# Patient Record
Sex: MALE | Race: ASIAN | Employment: FULL TIME | URBAN - METROPOLITAN AREA
[De-identification: names, ages, dates, MRNs, and addresses within clinical notes are randomized per-mention and may not be internally consistent; named-entity substitution may affect disease eponyms.]

---

## 2019-03-30 ENCOUNTER — HOSPITAL ENCOUNTER (EMERGENCY)
Age: 39
Discharge: HOME | End: 2019-03-30
Attending: EMERGENCY MEDICINE | Admitting: EMERGENCY MEDICINE
Payer: SUBSIDIZED

## 2019-03-30 ENCOUNTER — APPOINTMENT (OUTPATIENT)
Dept: CT IMAGING | Age: 39
End: 2019-03-30
Attending: NURSE PRACTITIONER
Payer: SUBSIDIZED

## 2019-03-30 VITALS
TEMPERATURE: 98.5 F | RESPIRATION RATE: 16 BRPM | HEART RATE: 88 BPM | OXYGEN SATURATION: 97 % | DIASTOLIC BLOOD PRESSURE: 80 MMHG | SYSTOLIC BLOOD PRESSURE: 122 MMHG

## 2019-03-30 DIAGNOSIS — K04.7 DENTAL ABSCESS: Primary | ICD-10-CM

## 2019-03-30 LAB
ALBUMIN SERPL-MCNC: 3.5 G/DL (ref 3.5–5)
ALBUMIN/GLOB SERPL: 0.8 {RATIO} (ref 1.1–2.2)
ALP SERPL-CCNC: 109 U/L (ref 45–117)
ALT SERPL-CCNC: 18 U/L (ref 12–78)
ANION GAP SERPL CALC-SCNC: 5 MMOL/L (ref 5–15)
AST SERPL-CCNC: 16 U/L (ref 15–37)
BASOPHILS # BLD: 0.1 K/UL (ref 0–0.1)
BASOPHILS NFR BLD: 1 % (ref 0–1)
BILIRUB SERPL-MCNC: 0.2 MG/DL (ref 0.2–1)
BUN SERPL-MCNC: 7 MG/DL (ref 6–20)
BUN/CREAT SERPL: 9 (ref 12–20)
CALCIUM SERPL-MCNC: 8.8 MG/DL (ref 8.5–10.1)
CHLORIDE SERPL-SCNC: 109 MMOL/L (ref 97–108)
CO2 SERPL-SCNC: 24 MMOL/L (ref 21–32)
COMMENT, HOLDF: NORMAL
CREAT SERPL-MCNC: 0.76 MG/DL (ref 0.7–1.3)
DIFFERENTIAL METHOD BLD: ABNORMAL
EOSINOPHIL # BLD: 0.1 K/UL (ref 0–0.4)
EOSINOPHIL NFR BLD: 1 % (ref 0–7)
ERYTHROCYTE [DISTWIDTH] IN BLOOD BY AUTOMATED COUNT: 12.9 % (ref 11.5–14.5)
GLOBULIN SER CALC-MCNC: 4.5 G/DL (ref 2–4)
GLUCOSE SERPL-MCNC: 94 MG/DL (ref 65–100)
HCT VFR BLD AUTO: 45.3 % (ref 36.6–50.3)
HGB BLD-MCNC: 14.2 G/DL (ref 12.1–17)
IMM GRANULOCYTES # BLD AUTO: 0.1 K/UL (ref 0–0.04)
IMM GRANULOCYTES NFR BLD AUTO: 1 % (ref 0–0.5)
LACTATE BLD-SCNC: 1.14 MMOL/L (ref 0.4–2)
LYMPHOCYTES # BLD: 2.2 K/UL (ref 0.8–3.5)
LYMPHOCYTES NFR BLD: 21 % (ref 12–49)
MCH RBC QN AUTO: 28.5 PG (ref 26–34)
MCHC RBC AUTO-ENTMCNC: 31.3 G/DL (ref 30–36.5)
MCV RBC AUTO: 90.8 FL (ref 80–99)
MONOCYTES # BLD: 1.1 K/UL (ref 0–1)
MONOCYTES NFR BLD: 11 % (ref 5–13)
NEUTS SEG # BLD: 7.1 K/UL (ref 1.8–8)
NEUTS SEG NFR BLD: 65 % (ref 32–75)
NRBC # BLD: 0 K/UL (ref 0–0.01)
NRBC BLD-RTO: 0 PER 100 WBC
PLATELET # BLD AUTO: 211 K/UL (ref 150–400)
PMV BLD AUTO: 8.8 FL (ref 8.9–12.9)
POTASSIUM SERPL-SCNC: 3.9 MMOL/L (ref 3.5–5.1)
PROT SERPL-MCNC: 8 G/DL (ref 6.4–8.2)
RBC # BLD AUTO: 4.99 M/UL (ref 4.1–5.7)
SAMPLES BEING HELD,HOLD: NORMAL
SODIUM SERPL-SCNC: 138 MMOL/L (ref 136–145)
WBC # BLD AUTO: 10.6 K/UL (ref 4.1–11.1)

## 2019-03-30 PROCEDURE — 96375 TX/PRO/DX INJ NEW DRUG ADDON: CPT

## 2019-03-30 PROCEDURE — 74011636320 HC RX REV CODE- 636/320: Performed by: RADIOLOGY

## 2019-03-30 PROCEDURE — 70487 CT MAXILLOFACIAL W/DYE: CPT

## 2019-03-30 PROCEDURE — 74011000250 HC RX REV CODE- 250: Performed by: NURSE PRACTITIONER

## 2019-03-30 PROCEDURE — 74011250636 HC RX REV CODE- 250/636: Performed by: NURSE PRACTITIONER

## 2019-03-30 PROCEDURE — 85025 COMPLETE CBC W/AUTO DIFF WBC: CPT

## 2019-03-30 PROCEDURE — 36415 COLL VENOUS BLD VENIPUNCTURE: CPT

## 2019-03-30 PROCEDURE — 80053 COMPREHEN METABOLIC PANEL: CPT

## 2019-03-30 PROCEDURE — 83605 ASSAY OF LACTIC ACID: CPT

## 2019-03-30 PROCEDURE — 87040 BLOOD CULTURE FOR BACTERIA: CPT

## 2019-03-30 PROCEDURE — 74011000258 HC RX REV CODE- 258: Performed by: RADIOLOGY

## 2019-03-30 PROCEDURE — 96361 HYDRATE IV INFUSION ADD-ON: CPT

## 2019-03-30 PROCEDURE — 96365 THER/PROPH/DIAG IV INF INIT: CPT

## 2019-03-30 PROCEDURE — 99284 EMERGENCY DEPT VISIT MOD MDM: CPT

## 2019-03-30 PROCEDURE — 74011250637 HC RX REV CODE- 250/637: Performed by: NURSE PRACTITIONER

## 2019-03-30 RX ORDER — CLINDAMYCIN HYDROCHLORIDE 300 MG/1
300 CAPSULE ORAL 4 TIMES DAILY
Qty: 40 CAP | Refills: 0 | Status: SHIPPED | OUTPATIENT
Start: 2019-03-30 | End: 2019-04-09

## 2019-03-30 RX ORDER — KETOROLAC TROMETHAMINE 30 MG/ML
15 INJECTION, SOLUTION INTRAMUSCULAR; INTRAVENOUS
Status: COMPLETED | OUTPATIENT
Start: 2019-03-30 | End: 2019-03-30

## 2019-03-30 RX ORDER — KETOROLAC TROMETHAMINE 30 MG/ML
15 INJECTION, SOLUTION INTRAMUSCULAR; INTRAVENOUS
Status: DISCONTINUED | OUTPATIENT
Start: 2019-03-30 | End: 2019-03-30

## 2019-03-30 RX ORDER — KETOROLAC TROMETHAMINE 30 MG/ML
15 INJECTION, SOLUTION INTRAMUSCULAR; INTRAVENOUS
Status: DISCONTINUED | OUTPATIENT
Start: 2019-03-30 | End: 2019-03-30 | Stop reason: SDUPTHER

## 2019-03-30 RX ORDER — CLINDAMYCIN PHOSPHATE 600 MG/50ML
600 INJECTION INTRAVENOUS
Status: COMPLETED | OUTPATIENT
Start: 2019-03-30 | End: 2019-03-30

## 2019-03-30 RX ORDER — SODIUM CHLORIDE 0.9 % (FLUSH) 0.9 %
10 SYRINGE (ML) INJECTION
Status: COMPLETED | OUTPATIENT
Start: 2019-03-30 | End: 2019-03-30

## 2019-03-30 RX ORDER — CHLORHEXIDINE GLUCONATE 1.2 MG/ML
15 RINSE ORAL 2 TIMES DAILY
Qty: 420 ML | Refills: 0 | Status: SHIPPED | OUTPATIENT
Start: 2019-03-30 | End: 2019-04-13

## 2019-03-30 RX ORDER — IBUPROFEN 600 MG/1
600 TABLET ORAL
Qty: 20 TAB | Refills: 0 | OUTPATIENT
Start: 2019-03-30 | End: 2021-08-07

## 2019-03-30 RX ADMIN — LIDOCAINE HYDROCHLORIDE: 20 SOLUTION ORAL; TOPICAL at 17:37

## 2019-03-30 RX ADMIN — IOPAMIDOL 100 ML: 612 INJECTION, SOLUTION INTRAVENOUS at 16:01

## 2019-03-30 RX ADMIN — KETOROLAC TROMETHAMINE 15 MG: 30 INJECTION, SOLUTION INTRAMUSCULAR; INTRAVENOUS at 13:59

## 2019-03-30 RX ADMIN — SODIUM CHLORIDE 1000 ML: 900 INJECTION, SOLUTION INTRAVENOUS at 13:59

## 2019-03-30 RX ADMIN — SODIUM CHLORIDE 100 ML: 900 INJECTION, SOLUTION INTRAVENOUS at 16:01

## 2019-03-30 RX ADMIN — CLINDAMYCIN PHOSPHATE 600 MG: 600 INJECTION, SOLUTION INTRAVENOUS at 14:41

## 2019-03-30 RX ADMIN — Medication 10 ML: at 16:01

## 2019-03-30 NOTE — ED PROVIDER NOTES
CC:Tooth ache and facial swelling Started:2-3 days ago. Swelling started yesterday. Endorses: facial swelling that is increasing and pain. No teeth on that side of the face, hurts to chew. Has dentures, but ill fitting. Mild HA. Low grade temp in triage. Denies: F/C, N/V. VA changes Made better: nothing Made worse:eating. No further complaints. Tried tylenol and motrin without help No past medical history on file. Reviewed Primary care provider: No primary care provider on file. The history is provided by the patient and the spouse. No  was used. No past medical history on file. No past surgical history on file. No family history on file. Social History Socioeconomic History  Marital status: Not on file Spouse name: Not on file  Number of children: Not on file  Years of education: Not on file  Highest education level: Not on file Occupational History  Not on file Social Needs  Financial resource strain: Not on file  Food insecurity:  
  Worry: Not on file Inability: Not on file  Transportation needs:  
  Medical: Not on file Non-medical: Not on file Tobacco Use  Smoking status: Not on file Substance and Sexual Activity  Alcohol use: Not on file  Drug use: Not on file  Sexual activity: Not on file Lifestyle  Physical activity:  
  Days per week: Not on file Minutes per session: Not on file  Stress: Not on file Relationships  Social connections:  
  Talks on phone: Not on file Gets together: Not on file Attends Orthodox service: Not on file Active member of club or organization: Not on file Attends meetings of clubs or organizations: Not on file Relationship status: Not on file  Intimate partner violence:  
  Fear of current or ex partner: Not on file Emotionally abused: Not on file Physically abused: Not on file Forced sexual activity: Not on file Other Topics Concern  Not on file Social History Narrative  Not on file ALLERGIES: Patient has no allergy information on record. Review of Systems Constitutional: Negative for chills and fever. HENT: Positive for dental problem and facial swelling. Negative for congestion, ear discharge, ear pain, rhinorrhea, sinus pressure, sore throat, trouble swallowing and voice change. Eyes: Negative for visual disturbance. Gastrointestinal: Negative for constipation, diarrhea, nausea and vomiting. All other systems reviewed and are negative. Vitals:  
 03/30/19 1247 03/30/19 1316 BP:  149/77 Pulse: (!) 113 (!) 107 Resp:  16 Temp:  99.7 °F (37.6 °C) SpO2: 98% (!) 9% Physical Exam  
Constitutional: He is oriented to person, place, and time. He appears well-developed and well-nourished. HENT:  
Head: Normocephalic and atraumatic. Mouth/Throat: Uvula is midline and oropharynx is clear and moist. He has dentures. Oral lesions present. There is trismus in the jaw. Abnormal dentition. Dental abscesses and dental caries present. No uvula swelling or lacerations. Broken teeth to the lower jaw under the partial denture. Bleeding and TTP around lower jaw. Unable to remove upper partial due to pain. No apparent erythema to the area. TTP over outer cheek and TTP over jaw. Pain with opening and closing the jaw. Unable to open jaw fully. Neck: Normal range of motion. Neck supple. Cardiovascular: Normal rate, regular rhythm, normal heart sounds and intact distal pulses. Pulmonary/Chest: Effort normal and breath sounds normal. No respiratory distress. He has no wheezes. He has no rales. He exhibits no tenderness. Abdominal: Soft. Bowel sounds are normal. There is no tenderness. There is no guarding. Musculoskeletal: Normal range of motion.   
Lymphadenopathy:  
     Head (right side): No submental, no submandibular, no tonsillar, no preauricular and no posterior auricular adenopathy present. Head (left side): No submental, no submandibular, no tonsillar, no preauricular and no posterior auricular adenopathy present. He has no cervical adenopathy. Neurological: He is alert and oriented to person, place, and time. Skin: Skin is warm and dry. No erythema. Psychiatric: He has a normal mood and affect. His behavior is normal. Judgment and thought content normal.  
Nursing note and vitals reviewed. MDM Procedures Assessment & Plan:  
 
Orders Placed This Encounter  CULTURE, BLOOD  CT MAXILLOFACIAL W CONT  CBC WITH AUTOMATED DIFF  
 METABOLIC PANEL, COMPREHENSIVE  
 Hold Sample  POC  LACTIC ACID  ketorolac (TORADOL) injection 15 mg  
 sodium chloride 0.9 % bolus infusion 1,000 mL Discussed with Maria E Kerns DO,ED Provider Leticia Montoya NP 
03/30/19 
1:22 PM 
 
CT with abscess. Clindamycin 4 times daily for 10 days. Peridex mouth wash Bid x 14 days. OMFS or dental follow-up. Discussed return precautions. 5:24 PM 
The patient has been reevaluated. The patient is ready for discharge. The patient's signs, symptoms, diagnosis, and discharge instructions have been discussed and the patient/ family has conveyed their understanding. The patient is to follow up as recommended or return to the ED should their symptoms worsen. Plan has been discussed and the patient is in agreement. LABORATORY TESTS: 
Labs Reviewed CBC WITH AUTOMATED DIFF - Abnormal; Notable for the following components:  
    Result Value MPV 8.8 (*) IMMATURE GRANULOCYTES 1 (*)   
 ABS. MONOCYTES 1.1 (*)   
 ABS. IMM. GRANS. 0.1 (*) All other components within normal limits METABOLIC PANEL, COMPREHENSIVE - Abnormal; Notable for the following components:  
 Chloride 109 (*)   
 BUN/Creatinine ratio 9 (*) Globulin 4.5 (*) A-G Ratio 0.8 (*) All other components within normal limits CULTURE, BLOOD SAMPLES BEING HELD POC LACTIC ACID IMAGING RESULTS: 
Ct Maxillofacial W Cont Result Date: 3/30/2019 EXAM: CT MAXILLOFACIAL W CONT INDICATION: Facial swelling with trismus COMPARISON: None. CONTRAST: 100 mL of Isovue-300 TECHNIQUE:  Multislice helical CT of the facial bones was performed in the axial plane during uneventful rapid bolus intravenous contrast administration. Coronal and sagittal reformations were generated. CT dose reduction was achieved through use of a standardized protocol tailored for this examination and automatic exposure control for dose modulation. FINDINGS: There is no facial fracture or other osseous abnormality. There are lucencies at the base of the right and left maxillary incisors and first two mandibular bicuspid teeth. Anterior to the left maxillary lucency there is a small fluid collection along the superficial surface of the maxilla through focal cortical breakthrough at the anterior margin of the lucency. The visualized paranasal sinuses and mastoid air cells show mucoperiosteal thickening in the left maxillary sinus but otherwise are clear. The globes, optic nerves and extraocular muscles are normal. No abnormalities are identified within the visualized portions of the brain or nasopharynx. IMPRESSION: Left maxillary periapical abscess with suspected cortical breakthrough anteriorly and small soft tissue abscess superficial to the maxilla. Additional right maxillary and left mandibular lucencies suggesting periapical abscesses versus dentigerous cysts. MEDICATIONS GIVEN: 
Medications  
dental ball (lidocaine/benadryl/benzocaine) mixture (has no administration in time range)  
ketorolac (TORADOL) injection 15 mg (15 mg IntraVENous Given 3/30/19 1359)  
sodium chloride 0.9 % bolus infusion 1,000 mL (0 mL IntraVENous IV Completed 3/30/19 1439) clindamycin (CLEOCIN) 600mg D5W 50mL IVPB (premix) (0 mg IntraVENous IV Completed 3/30/19 1511) iopamidol (ISOVUE 300) 61 % contrast injection 100 mL (100 mL IntraVENous Given 3/30/19 1601)  
sodium chloride 0.9 % bolus infusion 100 mL (100 mL IntraVENous New Bag 3/30/19 1601)  
sodium chloride (NS) flush 10 mL (10 mL IntraVENous Given 3/30/19 1601) IMPRESSION: 
1. Dental abscess PLAN: 
1. Current Discharge Medication List  
  
START taking these medications Details  
clindamycin (CLEOCIN) 300 mg capsule Take 1 Cap by mouth four (4) times daily for 10 days. Qty: 40 Cap, Refills: 0  
  
chlorhexidine (PERIDEX) 0.12 % solution 15 mL by Swish and Spit route two (2) times a day for 14 days. Indications: dental infection 
Qty: 420 mL, Refills: 0  
  
ibuprofen (MOTRIN) 600 mg tablet Take 1 Tab by mouth every six (6) hours as needed for Pain. Qty: 20 Tab, Refills: 0  
  
  
 
2. Follow-up Information Follow up With Specialties Details Why Contact Info Other, Phys, MD  Schedule an appointment as soon as possible for a visit As needed Patient can only remember the practice name and not the physician 
  
 dental provider in this packet  Schedule an appointment as soon as possible for a visit [facility]  Schedule an appointment as soon as possible for a visit  [address] [place] [address] [facility] EMERGENCY DEP Emergency Medicine  As needed, If symptoms worsen [address] [facility] [address] [phone] 3. Return to ED for new or worsening symptoms Shahzad Leyva NP

## 2019-03-30 NOTE — ED TRIAGE NOTES
Pt arrives ambulatory to ED with wife with c/o Left lower gum pain, redness and swelling onset 3 days ago with low grade temp.

## 2019-03-30 NOTE — DISCHARGE INSTRUCTIONS
Thank you for allowing us to care for you today. Please follow-up with your Primary Care provider in the next 2-3 days if your symptoms do not improve. Plan for home:     Salt water mouth rinses to help with inflammation and infection in the mouth    Clindamycin 4 times a day for  dental abscess    Follow-up next week in a dental clinic listed in this packet. Each provider is different. Please contact your preferred dental care provider and follow their directions. It would be best to follow-up with [facility] oral surgery, but without insurance, your visit could be expensive. Peridex mouth wash twice daily for 14 days to decrease the bacteria in your mouth. Use after eating and wait at least 30 minutes to eat or drink after using. Dental balls as needed for pain. Tylenol 1000 mg alternating with Ibuprofen 600mg every 6 hours for pain control. Example: 8am take Ibuprofen. 11am take tylenol. 2pm take ibuprofen. 5pm take tylenol. 8pm take ibuprofen. 11pm take tylenol. You may continue this process overnight if you have continued pain/discomfort. Emergency [facility]   [address] [place] [address], [facility]    Monday, Wednesday, Friday: 8am-5pm  Tuesday and Thursday: 8am-6pm  Phone: [phone]  $70 for Emergency Care  $60 for first routine care, then pay by sliding scale based upon income      [facility]  [address] [address] [address]   Monday-Friday: 8am-4pm  Phone: [phone] of Dentistry Urgent [address] [place] [address] Dentistry, [address] [place] [address] [address] Scott Chaudhary [address]  Phone: [phone] to confirm a time for emergency treatment  Pediatrics: [phone]  $75 per tooth, extractions only     [facility]  [address], [address] [facility] [address]   Phone [phone] or [phone]  Hours [id number] (extractions)  Simple tooth extraction: $60 per tooth, $55 per x-ray     Susana Vann the [facility] (in [place])  [facility] AT [place] only  Phone: [phone], leave message saying you need an appointment to register  Hours: Wed 6-9p       Non-Urgent [facility] ([facility]  Jayshree Azar [address]  Phone: [phone]        If you are on coumadin: Please monitor your INR or Coumadin levels while on this medication as it can increase your INR level substantially. Please be careful not to cut herself. Avoid shaving until you have completed her antibody therapy. Should you develop uncontrolled bleeding please go to the closest emergency department. We hope that we have addressed all of your medical concerns. The examination and treatment you received in the Emergency Department were for an emergent problem and were not intended as complete care. It is important that you follow up with your healthcare provider(s) for ongoing care. If your symptoms worsen or do not improve as expected, and you are unable to reach your usual health care provider(s), you should return to the Emergency Department. Today's healthcare is undergoing tremendous change, and patient satisfaction surveys are one of the many tools to assess the quality of medical care. You may receive a survey from the [employer] regarding your experience in the Emergency Department. I hope that your experience has been completely positive, particularly the medical care that I provided. As such, please participate in the survey; anything less than excellent does not meet my expectations or intentions. [facility] and [facility] participate in nationally recognized quality of care measures. If your blood pressure is greater than 120/80, as reported below, we urge that you seek medical care to address the potential of high blood pressure, commonly known as hypertension.    Hypertension can be hereditary or can be caused by certain medical conditions, pain, stress, or \"white coat syndrome. \"       Please make an appointment with your health care provider(s) for follow up of your Emergency Department visit. Thank you for allowing us to provide you with medical care today. We realize that you have many choices for your emergency care needs. Please choose us in the future for any continued health care needs. Regards,   Komal Boyer, [address] [facility] Cir: [phone]        Ct Maxillofacial W Cont    Result Date: 3/30/2019  EXAM: CT MAXILLOFACIAL W CONT INDICATION: Facial swelling with trismus COMPARISON: None. CONTRAST: 100 mL of Isovue-300 TECHNIQUE:  Multislice helical CT of the facial bones was performed in the axial plane during uneventful rapid bolus intravenous contrast administration. Coronal and sagittal reformations were generated. CT dose reduction was achieved through use of a standardized protocol tailored for this examination and automatic exposure control for dose modulation. FINDINGS: There is no facial fracture or other osseous abnormality. There are lucencies at the base of the right and left maxillary incisors and first two mandibular bicuspid teeth. Anterior to the left maxillary lucency there is a small fluid collection along the superficial surface of the maxilla through focal cortical breakthrough at the anterior margin of the lucency. The visualized paranasal sinuses and mastoid air cells show mucoperiosteal thickening in the left maxillary sinus but otherwise are clear. The globes, optic nerves and extraocular muscles are normal. No abnormalities are identified within the visualized portions of the brain or nasopharynx. IMPRESSION: Left maxillary periapical abscess with suspected cortical breakthrough anteriorly and small soft tissue abscess superficial to the maxilla.  Additional right maxillary and left mandibular lucencies suggesting periapical abscesses versus dentigerous cysts.

## 2019-03-30 NOTE — LETTER
[address] 
[address] [facility] [address] 
[phone] Work/School Note Date: 3/30/2019 To Whom It May concern: 
 
Caridad Staton was seen and treated today in the emergency room by the following provider(s): 
Attending Provider: Adriana Vargas DO 
Nurse Practitioner: Julius Laguna NP. Caridad Staton may return to work on Tuesday April 2, 2019. Sincerely, Akhil Alegria NP

## 2019-03-30 NOTE — LETTER
NOTIFICATION RETURN TO WORK / SCHOOL 
 
3/30/2019 6:08 PM 
 
Mr. Elias Greenwood [address] [address] To Whom It May Concern: 
 
Elias Greenwood is currently under the care of [facility]. He will return to work/school on: 4/1/2019. If there are questions or concerns please have the patient contact our office. Sincerely, 
 
 
 
 
Angelique Cortez 34

## 2019-04-05 LAB
BACTERIA SPEC CULT: NORMAL
SERVICE CMNT-IMP: NORMAL

## 2019-06-02 ENCOUNTER — APPOINTMENT (OUTPATIENT)
Dept: CT IMAGING | Age: 39
End: 2019-06-02
Attending: EMERGENCY MEDICINE
Payer: SUBSIDIZED

## 2019-06-02 ENCOUNTER — APPOINTMENT (OUTPATIENT)
Dept: ULTRASOUND IMAGING | Age: 39
End: 2019-06-02
Attending: EMERGENCY MEDICINE
Payer: SUBSIDIZED

## 2019-06-02 ENCOUNTER — HOSPITAL ENCOUNTER (EMERGENCY)
Age: 39
Discharge: HOME | End: 2019-06-02
Attending: EMERGENCY MEDICINE | Admitting: EMERGENCY MEDICINE
Payer: SUBSIDIZED

## 2019-06-02 VITALS
WEIGHT: 195 LBS | HEIGHT: 67 IN | TEMPERATURE: 98.6 F | HEART RATE: 73 BPM | SYSTOLIC BLOOD PRESSURE: 109 MMHG | DIASTOLIC BLOOD PRESSURE: 63 MMHG | RESPIRATION RATE: 18 BRPM | OXYGEN SATURATION: 100 % | BODY MASS INDEX: 30.61 KG/M2

## 2019-06-02 DIAGNOSIS — R10.11 ABDOMINAL PAIN, RIGHT UPPER QUADRANT: Primary | ICD-10-CM

## 2019-06-02 LAB
ALBUMIN SERPL-MCNC: 4.1 G/DL (ref 3.5–5)
ALBUMIN/GLOB SERPL: 0.9 {RATIO} (ref 1.1–2.2)
ALP SERPL-CCNC: 113 U/L (ref 45–117)
ALT SERPL-CCNC: 31 U/L (ref 12–78)
ANION GAP SERPL CALC-SCNC: 6 MMOL/L (ref 5–15)
APPEARANCE UR: CLEAR
AST SERPL-CCNC: 24 U/L (ref 15–37)
BACTERIA URNS QL MICRO: NEGATIVE /HPF
BASOPHILS # BLD: 0 K/UL (ref 0–0.1)
BASOPHILS NFR BLD: 0 % (ref 0–1)
BILIRUB SERPL-MCNC: 0.5 MG/DL (ref 0.2–1)
BILIRUB UR QL: NEGATIVE
BUN SERPL-MCNC: 15 MG/DL (ref 6–20)
BUN/CREAT SERPL: 16 (ref 12–20)
CALCIUM SERPL-MCNC: 9.5 MG/DL (ref 8.5–10.1)
CHLORIDE SERPL-SCNC: 106 MMOL/L (ref 97–108)
CO2 SERPL-SCNC: 25 MMOL/L (ref 21–32)
COLOR UR: ABNORMAL
COMMENT, HOLDF: NORMAL
CREAT SERPL-MCNC: 0.96 MG/DL (ref 0.7–1.3)
DIFFERENTIAL METHOD BLD: ABNORMAL
EOSINOPHIL # BLD: 0.1 K/UL (ref 0–0.4)
EOSINOPHIL NFR BLD: 0 % (ref 0–7)
EPITH CASTS URNS QL MICRO: ABNORMAL /LPF
ERYTHROCYTE [DISTWIDTH] IN BLOOD BY AUTOMATED COUNT: 12.9 % (ref 11.5–14.5)
GLOBULIN SER CALC-MCNC: 4.5 G/DL (ref 2–4)
GLUCOSE SERPL-MCNC: 105 MG/DL (ref 65–100)
GLUCOSE UR STRIP.AUTO-MCNC: NEGATIVE MG/DL
HCT VFR BLD AUTO: 46.9 % (ref 36.6–50.3)
HGB BLD-MCNC: 14.9 G/DL (ref 12.1–17)
HGB UR QL STRIP: ABNORMAL
IMM GRANULOCYTES # BLD AUTO: 0.1 K/UL (ref 0–0.04)
IMM GRANULOCYTES NFR BLD AUTO: 1 % (ref 0–0.5)
KETONES UR QL STRIP.AUTO: NEGATIVE MG/DL
LEUKOCYTE ESTERASE UR QL STRIP.AUTO: NEGATIVE
LIPASE SERPL-CCNC: 141 U/L (ref 73–393)
LYMPHOCYTES # BLD: 2.4 K/UL (ref 0.8–3.5)
LYMPHOCYTES NFR BLD: 14 % (ref 12–49)
MCH RBC QN AUTO: 28.4 PG (ref 26–34)
MCHC RBC AUTO-ENTMCNC: 31.8 G/DL (ref 30–36.5)
MCV RBC AUTO: 89.3 FL (ref 80–99)
MONOCYTES # BLD: 1.5 K/UL (ref 0–1)
MONOCYTES NFR BLD: 9 % (ref 5–13)
NEUTS SEG # BLD: 13.3 K/UL (ref 1.8–8)
NEUTS SEG NFR BLD: 76 % (ref 32–75)
NITRITE UR QL STRIP.AUTO: NEGATIVE
NRBC # BLD: 0 K/UL (ref 0–0.01)
NRBC BLD-RTO: 0 PER 100 WBC
PH UR STRIP: 5 [PH] (ref 5–8)
PLATELET # BLD AUTO: 178 K/UL (ref 150–400)
PMV BLD AUTO: 9.3 FL (ref 8.9–12.9)
POTASSIUM SERPL-SCNC: 4 MMOL/L (ref 3.5–5.1)
PROT SERPL-MCNC: 8.6 G/DL (ref 6.4–8.2)
PROT UR STRIP-MCNC: NEGATIVE MG/DL
RBC # BLD AUTO: 5.25 M/UL (ref 4.1–5.7)
RBC #/AREA URNS HPF: ABNORMAL /HPF (ref 0–5)
SAMPLES BEING HELD,HOLD: NORMAL
SODIUM SERPL-SCNC: 137 MMOL/L (ref 136–145)
SP GR UR REFRACTOMETRY: 1.02 (ref 1–1.03)
UR CULT HOLD, URHOLD: NORMAL
UROBILINOGEN UR QL STRIP.AUTO: 0.2 EU/DL (ref 0.2–1)
WBC # BLD AUTO: 17.4 K/UL (ref 4.1–11.1)
WBC URNS QL MICRO: ABNORMAL /HPF (ref 0–4)

## 2019-06-02 PROCEDURE — 80053 COMPREHEN METABOLIC PANEL: CPT

## 2019-06-02 PROCEDURE — 74011000258 HC RX REV CODE- 258: Performed by: RADIOLOGY

## 2019-06-02 PROCEDURE — 85025 COMPLETE CBC W/AUTO DIFF WBC: CPT

## 2019-06-02 PROCEDURE — 36415 COLL VENOUS BLD VENIPUNCTURE: CPT

## 2019-06-02 PROCEDURE — 81001 URINALYSIS AUTO W/SCOPE: CPT

## 2019-06-02 PROCEDURE — 96374 THER/PROPH/DIAG INJ IV PUSH: CPT

## 2019-06-02 PROCEDURE — 99284 EMERGENCY DEPT VISIT MOD MDM: CPT

## 2019-06-02 PROCEDURE — 76705 ECHO EXAM OF ABDOMEN: CPT

## 2019-06-02 PROCEDURE — 74177 CT ABD & PELVIS W/CONTRAST: CPT

## 2019-06-02 PROCEDURE — 83690 ASSAY OF LIPASE: CPT

## 2019-06-02 PROCEDURE — 74011250636 HC RX REV CODE- 250/636: Performed by: EMERGENCY MEDICINE

## 2019-06-02 PROCEDURE — 74011636320 HC RX REV CODE- 636/320: Performed by: RADIOLOGY

## 2019-06-02 PROCEDURE — 74011250637 HC RX REV CODE- 250/637: Performed by: EMERGENCY MEDICINE

## 2019-06-02 RX ORDER — ACETAMINOPHEN 500 MG
1000 TABLET ORAL
Status: COMPLETED | OUTPATIENT
Start: 2019-06-02 | End: 2019-06-02

## 2019-06-02 RX ORDER — FAMOTIDINE 20 MG/1
20 TABLET, FILM COATED ORAL 2 TIMES DAILY
Qty: 20 TAB | Refills: 0 | OUTPATIENT
Start: 2019-06-02 | End: 2021-08-07

## 2019-06-02 RX ORDER — ONDANSETRON 4 MG/1
4 TABLET, ORALLY DISINTEGRATING ORAL
Qty: 9 TAB | Refills: 0 | OUTPATIENT
Start: 2019-06-02 | End: 2021-08-07

## 2019-06-02 RX ORDER — HYDROCODONE BITARTRATE AND ACETAMINOPHEN 5; 325 MG/1; MG/1
1 TABLET ORAL
Status: COMPLETED | OUTPATIENT
Start: 2019-06-02 | End: 2019-06-02

## 2019-06-02 RX ORDER — MORPHINE SULFATE 10 MG/ML
6 INJECTION, SOLUTION INTRAMUSCULAR; INTRAVENOUS
Status: COMPLETED | OUTPATIENT
Start: 2019-06-02 | End: 2019-06-02

## 2019-06-02 RX ORDER — HYDROCODONE BITARTRATE AND ACETAMINOPHEN 5; 325 MG/1; MG/1
1 TABLET ORAL
Qty: 10 TAB | Refills: 0 | Status: SHIPPED | OUTPATIENT
Start: 2019-06-02 | End: 2019-06-04

## 2019-06-02 RX ORDER — SODIUM CHLORIDE 0.9 % (FLUSH) 0.9 %
10 SYRINGE (ML) INJECTION
Status: DISCONTINUED | OUTPATIENT
Start: 2019-06-02 | End: 2019-06-03 | Stop reason: HOSPADM

## 2019-06-02 RX ADMIN — MORPHINE SULFATE 6 MG: 10 INJECTION, SOLUTION INTRAMUSCULAR; INTRAVENOUS at 20:03

## 2019-06-02 RX ADMIN — HYDROCODONE BITARTRATE AND ACETAMINOPHEN 1 TABLET: 5; 325 TABLET ORAL at 21:43

## 2019-06-02 RX ADMIN — IOPAMIDOL 100 ML: 755 INJECTION, SOLUTION INTRAVENOUS at 20:52

## 2019-06-02 RX ADMIN — Medication 10 ML: at 20:52

## 2019-06-02 RX ADMIN — ACETAMINOPHEN 1000 MG: 500 TABLET ORAL at 20:03

## 2019-06-02 RX ADMIN — SODIUM CHLORIDE 100 ML: 900 INJECTION, SOLUTION INTRAVENOUS at 20:52

## 2019-06-02 NOTE — LETTER
[address] 
[address] [place] [address] 
[phone] Work/School Note Date: 6/2/2019 To Whom It May concern: 
 
Cristian Jin was seen and treated today in the emergency room by the following provider(s): 
Attending Provider: Shruthi Rojas MD 
Physician Assistant: Ten Hernandez PA-C. Cristian Jin may return to work on 06/0519. Sincerely, Murray Myers

## 2019-06-02 NOTE — ED TRIAGE NOTES
Arrived from home with wife c/o left upper abdominal pain since last night. History of abdominal pain. Complaint of sore throat.

## 2019-06-02 NOTE — ED PROVIDER NOTES
79-year-old male presents to emergency room for evaluation of right upper quadrant abdominal pain. Patient states he's had this pain intermittently for 3 years. Current episode of pain began yesterday. Pain is described as a squeezing sensation. Radiates to the back. Patient assessing nausea but no vomiting. No fevers chills. No diarrhea. No chest pain or shortness of breath difficulty breathing. No dizziness or lightheadedness. No dysuria, frequency or urgency. No noted blood in the urine. Patient took ibuprofen with no relief. No known to meds. No alleviating factors. Social hx  +smoker  +alcohol    The history is provided by the patient and the spouse. No  was used. No past medical history on file. Past Surgical History:   Procedure Laterality Date    HX APPENDECTOMY           No family history on file. Social History     Socioeconomic History    Marital status: SINGLE     Spouse name: Not on file    Number of children: Not on file    Years of education: Not on file    Highest education level: Not on file   Occupational History    Not on file   Social Needs    Financial resource strain: Not on file    Food insecurity:     Worry: Not on file     Inability: Not on file    Transportation needs:     Medical: Not on file     Non-medical: Not on file   Tobacco Use    Smoking status: Current Every Day Smoker    Smokeless tobacco: Never Used   Substance and Sexual Activity    Alcohol use:  Yes    Drug use: Never    Sexual activity: Not on file   Lifestyle    Physical activity:     Days per week: Not on file     Minutes per session: Not on file    Stress: Not on file   Relationships    Social connections:     Talks on phone: Not on file     Gets together: Not on file     Attends Sikhism service: Not on file     Active member of club or organization: Not on file     Attends meetings of clubs or organizations: Not on file     Relationship status: Not on file    Intimate partner violence:     Fear of current or ex partner: Not on file     Emotionally abused: Not on file     Physically abused: Not on file     Forced sexual activity: Not on file   Other Topics Concern    Not on file   Social History Narrative    Not on file         ALLERGIES: Patient has no known allergies. Review of Systems   Constitutional: Positive for chills. Negative for fever. HENT: Negative for congestion and sore throat. Respiratory: Positive for cough. Negative for chest tightness and shortness of breath. Gastrointestinal: Positive for abdominal pain and nausea. Negative for diarrhea and vomiting. Genitourinary: Negative for difficulty urinating, dysuria and hematuria. Musculoskeletal: Negative for back pain, neck pain and neck stiffness. Neurological: Negative for headaches. There were no vitals filed for this visit. Physical Exam   Constitutional: He is oriented to person, place, and time. He appears well-developed and well-nourished. No distress. HENT:   Head: Normocephalic and atraumatic. Right Ear: External ear normal.   Left Ear: External ear normal.   Eyes: Pupils are equal, round, and reactive to light. Conjunctivae and EOM are normal.   Neck: Normal range of motion. Neck supple. Cardiovascular: Regular rhythm and normal heart sounds. Pulmonary/Chest: Effort normal and breath sounds normal. No respiratory distress. He has no wheezes. Abdominal: Soft. Normal appearance and bowel sounds are normal. He exhibits no shifting dullness, no distension, no pulsatile liver, no fluid wave, no abdominal bruit, no ascites, no pulsatile midline mass and no mass. There is no hepatosplenomegaly, splenomegaly or hepatomegaly. There is tenderness. There is no rigidity, no rebound, no guarding, no CVA tenderness, no tenderness at McBurney's point and negative Barahona's sign. Abdomen exposed for exam.  Soft, no peritoneal signs  Tender right upper quadrant and flank. Musculoskeletal: Normal range of motion. He exhibits no edema or tenderness. Neurological: He is alert and oriented to person, place, and time. He has normal reflexes. He displays normal reflexes. No cranial nerve deficit. Coordination normal.   Skin: Skin is warm and dry. No rash noted. No erythema. Psychiatric: He has a normal mood and affect. His behavior is normal. Judgment and thought content normal.   Nursing note and vitals reviewed. MDM  Number of Diagnoses or Management Options  Abdominal pain, right upper quadrant:   Diagnosis management comments: 44 yo  male presenting for quadrant pain. Is tender in the right upper quadrant. He appears uncomfortable but is in no distress. He is alert temperature 100.8. Lungs are clear. The peritoneal signs. Plan will start with ultrasound, check labs, pain control, iv fluid     The patient is resting comfortably and feels better, is alert and in no distress. The repeat examination is unremarkable and benign; in particular, there is no discomfort at McBurney's point. The history, exam, diagnostic testing, and current condition do not suggest acute appendicitis, bowel obstruction, incarcerated hernia, acute cholecystitis, bowel perforation, major gastrointestinal bleeding, severe diverticulitis, sepsis, or other significant pathology to warrant further testing, continued ED treatment, admission, or surgical evaluation at this point. The vital signs have been stable and are within normal limits at this time. The patient does not have uncontrollable pain, intractable vomiting, or other significant symptoms. The patient's condition is stable and appropriate for discharge. The patient will pursue further outpatient evaluation with the primary care physician or other designated or consulting physician as indicated in the discharge instructions. Patient's results have been reviewed with them.   Patient and/or family have verbally conveyed their understanding and agreement of the patient's signs, symptoms, diagnosis, treatment and prognosis and additionally agree to follow up as recommended or return to the Emergency Room should their condition change prior to follow-up. Discharge instructions have also been provided to the patient with some educational information regarding their diagnosis as well a list of reasons why they would want to return to the ER prior to their follow-up appointment should their condition change. Amount and/or Complexity of Data Reviewed  Discuss the patient with other providers: yes (ER attendingMeri)    Patient Progress  Patient progress: stable         Procedures      Pt case including HPI, PE, and all available lab and radiology results has been discussed with attending physician. Opportunity to evaluate patient has been provided to ER attending. Discharge and prescription plan has been agreed upon.

## 2019-06-03 NOTE — DISCHARGE INSTRUCTIONS
Pepcid:1 pill twice a day  Zofran:1 pill every 8 hours as needed for nausea  Norco:1 pill every 6 hours as needed for pain. No alcohol or driving with this medicine. Return to ER for any vomiting, inability to drink, fever. Abdominal Pain: Care Instructions  Your Care Instructions    Abdominal pain has many possible causes. Some aren't serious and get better on their own in a few days. Others need more testing and treatment. If your pain continues or gets worse, you need to be rechecked and may need more tests to find out what is wrong. You may need surgery to correct the problem. Don't ignore new symptoms, such as fever, nausea and vomiting, urination problems, pain that gets worse, and dizziness. These may be signs of a more serious problem. Your doctor may have recommended a follow-up visit in the next 8 to 12 hours. If you are not getting better, you may need more tests or treatment. The doctor has checked you carefully, but problems can develop later. If you notice any problems or new symptoms, get medical treatment right away. Follow-up care is a key part of your treatment and safety. Be sure to make and go to all appointments, and call your doctor if you are having problems. It's also a good idea to know your test results and keep a list of the medicines you take. How can you care for yourself at home? · Rest until you feel better. · To prevent dehydration, drink plenty of fluids, enough so that your urine is light yellow or clear like water. Choose water and other caffeine-free clear liquids until you feel better. If you have kidney, heart, or liver disease and have to limit fluids, talk with your doctor before you increase the amount of fluids you drink. · If your stomach is upset, eat mild foods, such as rice, dry toast or crackers, bananas, and applesauce. Try eating several small meals instead of two or three large ones.   · Wait until 48 hours after all symptoms have gone away before you have spicy foods, alcohol, and drinks that contain caffeine. · Do not eat foods that are high in fat. · Avoid anti-inflammatory medicines such as aspirin, ibuprofen (Advil, Motrin), and naproxen (Aleve). These can cause stomach upset. Talk to your doctor if you take daily aspirin for another health problem. When should you call for help? Call 911 anytime you think you may need emergency care. For example, call if:    · You passed out (lost consciousness).     · You pass maroon or very bloody stools.     · You vomit blood or what looks like coffee grounds.     · You have new, severe belly pain.    Call your doctor now or seek immediate medical care if:    · Your pain gets worse, especially if it becomes focused in one area of your belly.     · You have a new or higher fever.     · Your stools are black and look like tar, or they have streaks of blood.     · You have unexpected vaginal bleeding.     · You have symptoms of a urinary tract infection. These may include:  ? Pain when you urinate. ? Urinating more often than usual.  ? Blood in your urine.     · You are dizzy or lightheaded, or you feel like you may faint.    Watch closely for changes in your health, and be sure to contact your doctor if:    · You are not getting better after 1 day (24 hours). Where can you learn more? Go to http://chyna-mike.info/. Enter J114 in the search box to learn more about \"Abdominal Pain: Care Instructions. \"  Current as of: September 23, 2018  Content Version: 11.9  © 3695-4344 [employer]. Care instructions adapted under license by [facility] (which disclaims liability or warranty for this information). If you have questions about a medical condition or this instruction, always ask your healthcare professional. [address] any warranty or liability for your use of this information.

## 2019-06-03 NOTE — ED NOTES
Pt requesting more pain medicine and rates pain 7/10. Pt appears visibly uncomfortable. [facility] [place] [facility].

## 2019-06-10 ENCOUNTER — APPOINTMENT (OUTPATIENT)
Dept: VASCULAR SURGERY | Age: 39
End: 2019-06-10
Attending: EMERGENCY MEDICINE
Payer: SUBSIDIZED

## 2019-06-10 ENCOUNTER — APPOINTMENT (OUTPATIENT)
Dept: GENERAL RADIOLOGY | Age: 39
End: 2019-06-10
Attending: EMERGENCY MEDICINE
Payer: SUBSIDIZED

## 2019-06-10 ENCOUNTER — HOSPITAL ENCOUNTER (EMERGENCY)
Age: 39
Discharge: HOME | End: 2019-06-10
Attending: EMERGENCY MEDICINE
Payer: SUBSIDIZED

## 2019-06-10 VITALS
RESPIRATION RATE: 15 BRPM | DIASTOLIC BLOOD PRESSURE: 75 MMHG | TEMPERATURE: 97.9 F | OXYGEN SATURATION: 97 % | SYSTOLIC BLOOD PRESSURE: 117 MMHG | HEART RATE: 80 BPM

## 2019-06-10 DIAGNOSIS — M79.604 PAIN OF RIGHT LOWER EXTREMITY: Primary | ICD-10-CM

## 2019-06-10 LAB
BASOPHILS # BLD: 0.1 K/UL (ref 0–0.1)
BASOPHILS NFR BLD: 1 % (ref 0–1)
DIFFERENTIAL METHOD BLD: ABNORMAL
EOSINOPHIL # BLD: 0.1 K/UL (ref 0–0.4)
EOSINOPHIL NFR BLD: 1 % (ref 0–7)
ERYTHROCYTE [DISTWIDTH] IN BLOOD BY AUTOMATED COUNT: 12.9 % (ref 11.5–14.5)
HCT VFR BLD AUTO: 44.5 % (ref 36.6–50.3)
HGB BLD-MCNC: 13.8 G/DL (ref 12.1–17)
IMM GRANULOCYTES # BLD AUTO: 0.1 K/UL (ref 0–0.04)
IMM GRANULOCYTES NFR BLD AUTO: 1 % (ref 0–0.5)
LYMPHOCYTES # BLD: 2.2 K/UL (ref 0.8–3.5)
LYMPHOCYTES NFR BLD: 18 % (ref 12–49)
MCH RBC QN AUTO: 28.2 PG (ref 26–34)
MCHC RBC AUTO-ENTMCNC: 31 G/DL (ref 30–36.5)
MCV RBC AUTO: 91 FL (ref 80–99)
MONOCYTES # BLD: 0.9 K/UL (ref 0–1)
MONOCYTES NFR BLD: 7 % (ref 5–13)
NEUTS SEG # BLD: 8.8 K/UL (ref 1.8–8)
NEUTS SEG NFR BLD: 72 % (ref 32–75)
NRBC # BLD: 0 K/UL (ref 0–0.01)
NRBC BLD-RTO: 0 PER 100 WBC
PLATELET # BLD AUTO: 246 K/UL (ref 150–400)
PMV BLD AUTO: 8.7 FL (ref 8.9–12.9)
RBC # BLD AUTO: 4.89 M/UL (ref 4.1–5.7)
WBC # BLD AUTO: 12.2 K/UL (ref 4.1–11.1)

## 2019-06-10 PROCEDURE — 93971 EXTREMITY STUDY: CPT

## 2019-06-10 PROCEDURE — 96372 THER/PROPH/DIAG INJ SC/IM: CPT

## 2019-06-10 PROCEDURE — 73552 X-RAY EXAM OF FEMUR 2/>: CPT

## 2019-06-10 PROCEDURE — 74011636637 HC RX REV CODE- 636/637: Performed by: EMERGENCY MEDICINE

## 2019-06-10 PROCEDURE — 72100 X-RAY EXAM L-S SPINE 2/3 VWS: CPT

## 2019-06-10 PROCEDURE — 85025 COMPLETE CBC W/AUTO DIFF WBC: CPT

## 2019-06-10 PROCEDURE — 36415 COLL VENOUS BLD VENIPUNCTURE: CPT

## 2019-06-10 PROCEDURE — 73590 X-RAY EXAM OF LOWER LEG: CPT

## 2019-06-10 PROCEDURE — 99283 EMERGENCY DEPT VISIT LOW MDM: CPT

## 2019-06-10 PROCEDURE — 74011250636 HC RX REV CODE- 250/636: Performed by: EMERGENCY MEDICINE

## 2019-06-10 RX ORDER — NAPROXEN 500 MG/1
500 TABLET ORAL 2 TIMES DAILY WITH MEALS
Qty: 20 TAB | Refills: 0 | OUTPATIENT
Start: 2019-06-10 | End: 2021-08-07

## 2019-06-10 RX ORDER — HYDROCODONE BITARTRATE AND ACETAMINOPHEN 5; 325 MG/1; MG/1
1 TABLET ORAL
Qty: 8 TAB | Refills: 0 | Status: SHIPPED | OUTPATIENT
Start: 2019-06-10 | End: 2019-06-12

## 2019-06-10 RX ORDER — KETOROLAC TROMETHAMINE 30 MG/ML
60 INJECTION, SOLUTION INTRAMUSCULAR; INTRAVENOUS
Status: COMPLETED | OUTPATIENT
Start: 2019-06-10 | End: 2019-06-10

## 2019-06-10 RX ORDER — PREDNISONE 20 MG/1
60 TABLET ORAL
Status: COMPLETED | OUTPATIENT
Start: 2019-06-10 | End: 2019-06-10

## 2019-06-10 RX ORDER — BISMUTH SUBSALICYLATE 262 MG/15ML
30 LIQUID ORAL
Qty: 354 ML | Refills: 0 | Status: SHIPPED | OUTPATIENT
Start: 2019-06-10 | End: 2019-06-10 | Stop reason: CLARIF

## 2019-06-10 RX ADMIN — PREDNISONE 60 MG: 20 TABLET ORAL at 14:04

## 2019-06-10 RX ADMIN — KETOROLAC TROMETHAMINE 60 MG: 30 INJECTION, SOLUTION INTRAMUSCULAR at 14:04

## 2019-06-10 NOTE — ED NOTES
1741: Patient verbalizes understanding of discharge instructions [facility] Opportunity for questions provided. Patient in no apparent distress. Patient ambulatory upon discharge.    Visit Vitals  /75 (BP 1 Location: Left arm, BP Patient Position: At rest;Sitting)   Pulse 80   Temp 97.9 °F (36.6 °C)   Resp 15   SpO2 97%

## 2019-06-10 NOTE — DISCHARGE INSTRUCTIONS
Advil or aleve for pain. Norco:1 pill every 6 hours as needed for severe pain. Be aware of sedating effects, no alcohol or driving with this medicine. Keep elevated for next 48 hours. Place ice in a bag and apply to  for 20 minutes 4-5 times a day for next 48 hours. Return to ER for any redness, warmth, increased swelling  fever over 101. Leg Pain: Care Instructions  Your Care Instructions  Many things can cause leg pain. Too much exercise or overuse can cause a muscle cramp (or charley horse). You can get leg cramps from not eating a balanced diet that has enough potassium, calcium, and other minerals. If you do not drink enough fluids or are taking certain medicines, you may develop leg cramps. Other causes of leg pain include injuries, blood flow problems, nerve damage, and twisted and enlarged veins (varicose veins). You can usually ease pain with self-care. Your doctor may recommend that you rest your leg and keep it elevated. Follow-up care is a key part of your treatment and safety. Be sure to make and go to all appointments, and call your doctor if you are having problems. It's also a good idea to know your test results and keep a list of the medicines you take. How can you care for yourself at home? · Take pain medicines exactly as directed. ? If the doctor gave you a prescription medicine for pain, take it as prescribed. ? If you are not taking a prescription pain medicine, ask your doctor if you can take an over-the-counter medicine. · Take any other medicines exactly as prescribed. Call your doctor if you think you are having a problem with your medicine. · Rest your leg while you have pain, and avoid standing for long periods of time. · Prop up your leg at or above the level of your heart when possible. · Make sure you are eating a balanced diet that is rich in calcium, potassium, and magnesium, especially if you are pregnant.   · If directed by your doctor, put ice or a cold pack on the area for 10 to 20 minutes at a time. Put a thin cloth between the ice and your skin. · Your leg may be in a splint, a brace, or an elastic bandage, and you may have crutches to help you walk. Follow your doctor's directions about how long to wear supports and how to use the crutches. When should you call for help? Call 911 anytime you think you may need emergency care. For example, call if:    · You have sudden chest pain and shortness of breath, or you cough up blood.     · Your leg is cool or pale or changes color.    Call your doctor now or seek immediate medical care if:    · You have increasing or severe pain.     · Your leg suddenly feels weak and you cannot move it.     · You have signs of a blood clot, such as:  ? Pain in your calf, back of the knee, thigh, or groin. ? Redness and swelling in your leg or groin.     · You have signs of infection, such as:  ? Increased pain, swelling, warmth, or redness. ? Red streaks leading from the sore area. ? Pus draining from a place on your leg. ? A fever.     · You cannot bear weight on your leg.    Watch closely for changes in your health, and be sure to contact your doctor if:    · You do not get better as expected. Where can you learn more? Go to http://chyna-mike.info/. Enter N751 in the search box to learn more about \"Leg Pain: Care Instructions. \"  Current as of: September 23, 2018  Content Version: 11.9  © 7539-1896 [employer]. Care instructions adapted under license by [employer] (which disclaims liability or warranty for this information). If you have questions about a medical condition or this instruction, always ask your healthcare professional. [place] [address] any warranty or liability for your use of this information.

## 2019-06-10 NOTE — ED TRIAGE NOTES
C/o right hip pain that radiates down into right knee that started last night. +slight back pain. +tingling in toes.

## 2019-06-10 NOTE — ED PROVIDER NOTES
42 yo male presents to ER for evaluation for right leg pain. Pain began yesterday and has been constant. Pain radiates from hip to knee. Pt has mild back pain. No fever, chills. No chest pain, shortness of breath. Mild tingling in toes. No trouble urinating. No loss of bowel or bladder control. No weakness. Pain worse with walking and weight bearing. No medicines taken for pain. No alleviating factors    Social hx  + smoker      The history is provided by the patient. No  was used. Leg Pain    Associated symptoms include back pain. Pertinent negatives include no numbness and no neck pain. No past medical history on file. Past Surgical History:   Procedure Laterality Date    HX APPENDECTOMY           No family history on file. Social History     Socioeconomic History    Marital status:      Spouse name: Not on file    Number of children: Not on file    Years of education: Not on file    Highest education level: Not on file   Occupational History    Not on file   Social Needs    Financial resource strain: Not on file    Food insecurity:     Worry: Not on file     Inability: Not on file    Transportation needs:     Medical: Not on file     Non-medical: Not on file   Tobacco Use    Smoking status: Current Every Day Smoker    Smokeless tobacco: Never Used   Substance and Sexual Activity    Alcohol use:  Yes    Drug use: Never    Sexual activity: Not on file   Lifestyle    Physical activity:     Days per week: Not on file     Minutes per session: Not on file    Stress: Not on file   Relationships    Social connections:     Talks on phone: Not on file     Gets together: Not on file     Attends Spiritism service: Not on file     Active member of club or organization: Not on file     Attends meetings of clubs or organizations: Not on file     Relationship status: Not on file    Intimate partner violence:     Fear of current or ex partner: Not on file Emotionally abused: Not on file     Physically abused: Not on file     Forced sexual activity: Not on file   Other Topics Concern    Not on file   Social History Narrative    Not on file         ALLERGIES: Patient has no known allergies. Review of Systems   Constitutional: Negative for chills and fever. HENT: Negative for congestion. Respiratory: Negative for cough, chest tightness and shortness of breath. Cardiovascular: Negative for chest pain. Gastrointestinal: Negative for abdominal pain, nausea and vomiting. Genitourinary: Negative for decreased urine volume, difficulty urinating, dysuria, flank pain, frequency and urgency. Musculoskeletal: Positive for back pain. Negative for arthralgias, neck pain and neck stiffness. Skin: Negative for rash and wound. Neurological: Negative for weakness, numbness and headaches. All other systems reviewed and are negative. Vitals:    06/10/19 1135   Pulse: (!) 104   SpO2: 98%            Physical Exam   Constitutional: He is oriented to person, place, and time. He appears well-developed and well-nourished. HENT:   Head: Normocephalic and atraumatic. Eyes: Pupils are equal, round, and reactive to light. Conjunctivae are normal.   Neck: Normal range of motion. Neck supple. Cardiovascular: Normal rate and regular rhythm. Pulmonary/Chest: Effort normal and breath sounds normal.   Musculoskeletal: Normal range of motion. Right leg: no redness, warmth or swelling. No focal point of pain  5/5 great toe strength  Able to lift leg with pain. 5/5 flexion/extension  ambulatory    No midline tenderness to palpation of TLS spine pain   Neurological: He is alert and oriented to person, place, and time. Skin: Skin is warm and dry. Capillary refill takes less than 2 seconds. Psychiatric: He has a normal mood and affect. His behavior is normal.   Nursing note and vitals reviewed.        MDM  Number of Diagnoses or Management Options  Pain of right lower extremity:   Diagnosis management comments: 42 yo male with right leg pain. No fever. No redness, warmth swelling to leg  No signs of infection. No midline tenderness to palpation of spine. Imaging negative. No signs of infection to leg. Ambulatory  Will treat with nsaid and pain medicine with ortho follow-up    Standard narcotic and sedating medication warnings given  Patient's results have been reviewed with them. Patient and/or family have verbally conveyed their understanding and agreement of the patient's signs, symptoms, diagnosis, treatment and prognosis and additionally agree to follow up as recommended or return to the Emergency Room should their condition change prior to follow-up. Discharge instructions have also been provided to the patient with some educational information regarding their diagnosis as well a list of reasons why they would want to return to the ER prior to their follow-up appointment should their condition change. Amount and/or Complexity of Data Reviewed  Discuss the patient with other providers: yes (ER attending-Saqib)    Patient Progress  Patient progress: stable         Procedures        Pt has been seen and evaluated by attending physician who has reviewed lab work and imaging tests. He agrees with discharge and prescription plan.

## 2021-08-06 ENCOUNTER — APPOINTMENT (OUTPATIENT)
Dept: CT IMAGING | Age: 41
End: 2021-08-06
Attending: EMERGENCY MEDICINE
Payer: COMMERCIAL

## 2021-08-06 ENCOUNTER — HOSPITAL ENCOUNTER (EMERGENCY)
Age: 41
Discharge: HOME | End: 2021-08-07
Attending: EMERGENCY MEDICINE
Payer: COMMERCIAL

## 2021-08-06 DIAGNOSIS — K29.60 NSAID INDUCED GASTRITIS: Primary | ICD-10-CM

## 2021-08-06 DIAGNOSIS — T39.395A NSAID INDUCED GASTRITIS: Primary | ICD-10-CM

## 2021-08-06 DIAGNOSIS — K29.01 ACUTE SUPERFICIAL GASTRITIS WITH HEMORRHAGE: ICD-10-CM

## 2021-08-06 LAB
ALBUMIN SERPL-MCNC: 4 G/DL (ref 3.5–5)
ALBUMIN/GLOB SERPL: 0.9 {RATIO} (ref 1.1–2.2)
ALP SERPL-CCNC: 107 U/L (ref 45–117)
ALT SERPL-CCNC: 22 U/L (ref 12–78)
ANION GAP SERPL CALC-SCNC: 5 MMOL/L (ref 5–15)
AST SERPL-CCNC: 16 U/L (ref 15–37)
BASOPHILS # BLD: 0.1 K/UL (ref 0–0.1)
BASOPHILS NFR BLD: 0 % (ref 0–1)
BILIRUB SERPL-MCNC: 0.7 MG/DL (ref 0.2–1)
BUN SERPL-MCNC: 13 MG/DL (ref 6–20)
BUN/CREAT SERPL: 14 (ref 12–20)
CALCIUM SERPL-MCNC: 9.4 MG/DL (ref 8.5–10.1)
CHLORIDE SERPL-SCNC: 104 MMOL/L (ref 97–108)
CO2 SERPL-SCNC: 29 MMOL/L (ref 21–32)
COMMENT, HOLDF: NORMAL
CREAT SERPL-MCNC: 0.94 MG/DL (ref 0.7–1.3)
DIFFERENTIAL METHOD BLD: ABNORMAL
EOSINOPHIL # BLD: 0.1 K/UL (ref 0–0.4)
EOSINOPHIL NFR BLD: 1 % (ref 0–7)
ERYTHROCYTE [DISTWIDTH] IN BLOOD BY AUTOMATED COUNT: 12.8 % (ref 11.5–14.5)
GLOBULIN SER CALC-MCNC: 4.4 G/DL (ref 2–4)
GLUCOSE SERPL-MCNC: 105 MG/DL (ref 65–100)
HCT VFR BLD AUTO: 46.7 % (ref 36.6–50.3)
HGB BLD-MCNC: 15.1 G/DL (ref 12.1–17)
IMM GRANULOCYTES # BLD AUTO: 0.1 K/UL (ref 0–0.04)
IMM GRANULOCYTES NFR BLD AUTO: 1 % (ref 0–0.5)
LYMPHOCYTES # BLD: 2.3 K/UL (ref 0.8–3.5)
LYMPHOCYTES NFR BLD: 19 % (ref 12–49)
MCH RBC QN AUTO: 28.2 PG (ref 26–34)
MCHC RBC AUTO-ENTMCNC: 32.3 G/DL (ref 30–36.5)
MCV RBC AUTO: 87.3 FL (ref 80–99)
MONOCYTES # BLD: 1.1 K/UL (ref 0–1)
MONOCYTES NFR BLD: 9 % (ref 5–13)
NEUTS SEG # BLD: 8.7 K/UL (ref 1.8–8)
NEUTS SEG NFR BLD: 70 % (ref 32–75)
NRBC # BLD: 0 K/UL (ref 0–0.01)
NRBC BLD-RTO: 0 PER 100 WBC
PLATELET # BLD AUTO: 256 K/UL (ref 150–400)
PMV BLD AUTO: 9 FL (ref 8.9–12.9)
POTASSIUM SERPL-SCNC: 3.8 MMOL/L (ref 3.5–5.1)
PROT SERPL-MCNC: 8.4 G/DL (ref 6.4–8.2)
RBC # BLD AUTO: 5.35 M/UL (ref 4.1–5.7)
SALICYLATES SERPL-MCNC: 3.8 MG/DL (ref 2.8–20)
SAMPLES BEING HELD,HOLD: NORMAL
SODIUM SERPL-SCNC: 138 MMOL/L (ref 136–145)
WBC # BLD AUTO: 12.3 K/UL (ref 4.1–11.1)

## 2021-08-06 PROCEDURE — 93005 ELECTROCARDIOGRAM TRACING: CPT

## 2021-08-06 PROCEDURE — 80053 COMPREHEN METABOLIC PANEL: CPT

## 2021-08-06 PROCEDURE — 83690 ASSAY OF LIPASE: CPT

## 2021-08-06 PROCEDURE — 85025 COMPLETE CBC W/AUTO DIFF WBC: CPT

## 2021-08-06 PROCEDURE — 74011000636 HC RX REV CODE- 636: Performed by: RADIOLOGY

## 2021-08-06 PROCEDURE — 96375 TX/PRO/DX INJ NEW DRUG ADDON: CPT

## 2021-08-06 PROCEDURE — 80179 DRUG ASSAY SALICYLATE: CPT

## 2021-08-06 PROCEDURE — 99285 EMERGENCY DEPT VISIT HI MDM: CPT

## 2021-08-06 PROCEDURE — 74178 CT ABD&PLV WO CNTR FLWD CNTR: CPT

## 2021-08-06 PROCEDURE — 36415 COLL VENOUS BLD VENIPUNCTURE: CPT

## 2021-08-06 PROCEDURE — 96374 THER/PROPH/DIAG INJ IV PUSH: CPT

## 2021-08-06 PROCEDURE — 74011250636 HC RX REV CODE- 250/636: Performed by: EMERGENCY MEDICINE

## 2021-08-06 RX ORDER — FENTANYL CITRATE 50 UG/ML
50 INJECTION, SOLUTION INTRAMUSCULAR; INTRAVENOUS
Status: DISCONTINUED | OUTPATIENT
Start: 2021-08-06 | End: 2021-08-07 | Stop reason: HOSPADM

## 2021-08-06 RX ORDER — ONDANSETRON 2 MG/ML
4 INJECTION INTRAMUSCULAR; INTRAVENOUS
Status: DISCONTINUED | OUTPATIENT
Start: 2021-08-06 | End: 2021-08-07 | Stop reason: HOSPADM

## 2021-08-06 RX ADMIN — IOPAMIDOL 100 ML: 755 INJECTION, SOLUTION INTRAVENOUS at 23:30

## 2021-08-06 RX ADMIN — SODIUM CHLORIDE 1000 ML: 9 INJECTION, SOLUTION INTRAVENOUS at 22:39

## 2021-08-06 RX ADMIN — ONDANSETRON 4 MG: 2 INJECTION INTRAMUSCULAR; INTRAVENOUS at 22:54

## 2021-08-06 RX ADMIN — FENTANYL CITRATE 50 MCG: 50 INJECTION, SOLUTION INTRAMUSCULAR; INTRAVENOUS at 22:54

## 2021-08-07 VITALS
DIASTOLIC BLOOD PRESSURE: 69 MMHG | TEMPERATURE: 98.5 F | SYSTOLIC BLOOD PRESSURE: 122 MMHG | RESPIRATION RATE: 23 BRPM | HEART RATE: 97 BPM | OXYGEN SATURATION: 96 %

## 2021-08-07 LAB
ATRIAL RATE: 118 BPM
CALCULATED P AXIS, ECG09: 45 DEGREES
CALCULATED R AXIS, ECG10: 51 DEGREES
CALCULATED T AXIS, ECG11: 36 DEGREES
DIAGNOSIS, 93000: NORMAL
LIPASE SERPL-CCNC: 133 U/L (ref 73–393)
P-R INTERVAL, ECG05: 126 MS
Q-T INTERVAL, ECG07: 284 MS
QRS DURATION, ECG06: 78 MS
QTC CALCULATION (BEZET), ECG08: 398 MS
VENTRICULAR RATE, ECG03: 118 BPM

## 2021-08-07 PROCEDURE — 74011250636 HC RX REV CODE- 250/636: Performed by: EMERGENCY MEDICINE

## 2021-08-07 RX ORDER — SUCRALFATE 1 G/1
1 TABLET ORAL 4 TIMES DAILY
Qty: 40 TABLET | Refills: 0 | Status: SHIPPED | OUTPATIENT
Start: 2021-08-07 | End: 2021-08-17

## 2021-08-07 RX ORDER — ACETAMINOPHEN 500 MG
1000 TABLET ORAL
Qty: 20 TABLET | Refills: 0 | Status: SHIPPED | OUTPATIENT
Start: 2021-08-07 | End: 2022-06-13

## 2021-08-07 RX ORDER — FAMOTIDINE 20 MG/1
40 TABLET, FILM COATED ORAL 2 TIMES DAILY
Qty: 16 TABLET | Refills: 0 | Status: SHIPPED | OUTPATIENT
Start: 2021-08-07 | End: 2021-08-11

## 2021-08-07 RX ORDER — OMEPRAZOLE 20 MG/1
20 CAPSULE, DELAYED RELEASE ORAL DAILY
Qty: 30 CAPSULE | Refills: 0 | Status: SHIPPED | OUTPATIENT
Start: 2021-08-07 | End: 2021-09-06

## 2021-08-07 RX ADMIN — SODIUM CHLORIDE, POTASSIUM CHLORIDE, SODIUM LACTATE AND CALCIUM CHLORIDE 1000 ML: 600; 310; 30; 20 INJECTION, SOLUTION INTRAVENOUS at 00:37

## 2021-08-07 NOTE — ED PROVIDER NOTES
The history is provided by the patient. The history is limited by a language barrier. A  was used. Abdominal Pain   This is a new problem. The current episode started more than 2 days ago. The problem occurs constantly. The problem has been gradually worsening. The pain is associated with vomiting. The pain is located in the epigastric region. The quality of the pain is aching, burning and sharp. The pain is moderate. Associated symptoms include hematochezia and vomiting. Associated symptoms comments: Dark colored vomit that he is concerned is blood. Nothing worsens the pain. The pain is relieved by nothing. The patient's surgical history includes appendectomy. No past medical history on file. Past Surgical History:   Procedure Laterality Date    HX APPENDECTOMY           No family history on file. Social History     Socioeconomic History    Marital status:      Spouse name: Not on file    Number of children: Not on file    Years of education: Not on file    Highest education level: Not on file   Occupational History    Not on file   Tobacco Use    Smoking status: Current Every Day Smoker     Packs/day: 0.75    Smokeless tobacco: Never Used   Substance and Sexual Activity    Alcohol use: Yes    Drug use: Never    Sexual activity: Not on file   Other Topics Concern    Not on file   Social History Narrative    Not on file     Social Determinants of Health     Financial Resource Strain:     Difficulty of Paying Living Expenses:    Food Insecurity:     Worried About Running Out of Food in the Last Year:     [address] in the Last Year:    Transportation Needs:     Lack of Transportation (Medical):      Lack of Transportation (Non-Medical):    Physical Activity:     Days of Exercise per Week:     Minutes of Exercise per Session:    Stress:     Feeling of Stress :    Social Connections:     Frequency of Communication with Friends and Family:     Frequency of Social Gatherings with Friends and Family:     Attends Voodoo Services:     Active Member of Clubs or Organizations:     Attends Club or Organization Meetings:     Marital Status:    Intimate Partner Violence:     Fear of Current or Ex-Partner:     Emotionally Abused:     Physically Abused:     Sexually Abused: ALLERGIES: Patient has no known allergies. Review of Systems   Gastrointestinal: Positive for abdominal distention, abdominal pain, blood in stool, hematochezia and vomiting. All other systems reviewed and are negative. Vitals:    08/07/21 0052 08/07/21 0100 08/07/21 0107 08/07/21 0200   BP: 121/84 118/71  122/69   Pulse: (!) 124 (!) 106 (!) 104 97   Resp: 24 20  23   Temp: 98.5 °F (36.9 °C)      SpO2: 98% 95%  96%            Physical Exam  Vitals and nursing note reviewed. Constitutional:       General: He is not in acute distress. Appearance: He is well-developed. HENT:      Head: Normocephalic and atraumatic. Eyes:      Conjunctiva/sclera: Conjunctivae normal.   Neck:      Trachea: No tracheal deviation. Cardiovascular:      Rate and Rhythm: Normal rate and regular rhythm. Pulmonary:      Effort: Pulmonary effort is normal. No respiratory distress. Abdominal:      General: There is no distension. Tenderness: There is abdominal tenderness in the epigastric area and left upper quadrant. There is guarding. There is no rebound. Negative signs include Barahona's sign and McBurney's sign. Musculoskeletal:         General: No deformity. Normal range of motion. Cervical back: Neck supple. Skin:     General: Skin is warm and dry. Neurological:      Mental Status: He is alert. Cranial Nerves: No cranial nerve deficit. Psychiatric:         Behavior: Behavior normal.          [facility]  ED Course as of Aug 07 1014   Fri Aug 06, 2021   2234 EKG 2220: Rate 118, sinus tachycardia, septal Q waves. No ST segment or T wave abnormalities.  No previous tracings available for review. [DK]      ED Course User Index  [DK] Sophy Lui MD     59-year-old male presents with diffuse abdominal pain that he states is chronic in nature but has increased over the last few days. He is taking 6 to 8 packets of BC powders per day for pain I suspect he has some medication induced gastritis which could be causing some bleeding. He is also complaining of some bleeding from rectum which is not persistent. CT abdomen pelvis shows no acute source of bleeding, no signs of surgical etiology. Hemoglobin is high at 15 with suspected hemoconcentration likely from dehydration. He was initially tachycardic but was fluid resuscitated and provided supportive care measures which improved this. Will start patient on empiric PPI/H2 blocker/carafate therapy for symptomatic relief. Offered referral to gastroenterology for follow-up and endoscopy. We discussed stopping all NSAIDs by mouth especially BC powders to prevent worsening.     Procedures

## 2021-08-07 NOTE — ED TRIAGE NOTES
Triage: Pt arrives ambulatory from home with CC of vomiting bright red blood and also blood in his stool. Pt reports the blood is brown like coffee grounds. He also reports some clots. The blood coming out in his stool is bright red. Pt denies hx of liver problems. He is not on blood thinners. He endorses pain in the middle of his abdomen that sometimes radiates to both the left and right. He reports he is vomiting once a day for a few days. He describes his bowel movements as diarrhea but he now feels constipated.

## 2021-11-23 ENCOUNTER — HOSPITAL ENCOUNTER (EMERGENCY)
Age: 41
Discharge: HOME | End: 2021-11-23
Attending: EMERGENCY MEDICINE
Payer: COMMERCIAL

## 2021-11-23 VITALS
HEART RATE: 88 BPM | TEMPERATURE: 97.5 F | DIASTOLIC BLOOD PRESSURE: 86 MMHG | OXYGEN SATURATION: 99 % | RESPIRATION RATE: 18 BRPM | SYSTOLIC BLOOD PRESSURE: 137 MMHG

## 2021-11-23 DIAGNOSIS — R21 RASH AND OTHER NONSPECIFIC SKIN ERUPTION: Primary | ICD-10-CM

## 2021-11-23 PROCEDURE — 99281 EMR DPT VST MAYX REQ PHY/QHP: CPT

## 2021-11-23 RX ORDER — CLINDAMYCIN HYDROCHLORIDE 150 MG/1
450 CAPSULE ORAL 3 TIMES DAILY
Qty: 63 CAPSULE | Refills: 0 | Status: SHIPPED | OUTPATIENT
Start: 2021-11-23 | End: 2021-11-30

## 2021-11-23 RX ORDER — KETOCONAZOLE 20 MG/G
CREAM TOPICAL DAILY
Qty: 15 G | Refills: 0 | Status: SHIPPED | OUTPATIENT
Start: 2021-11-23 | End: 2022-06-13

## 2021-11-23 NOTE — ED PROVIDER NOTES
Lorinda Kanner is a 42 y.o. male without major PMhx who presents to ED with cc of \"painful bumps\" x 1 mo. Pt states he had been having GI difficulty and was here on 8/6/21. States shortly after that time, he noted onset of these bumps. Denies fevers. States his bumps are painful but he denies additional HA on top of this. Denies myalgias, chills, CP, SOB. States he has tried a topical cream on it 2-3 times. Unsure the name. PMHx: Reviewed, as below. PSHx: Reviewed, as below. PCP: Other, MD Christa    There are no other complaints verbalized at this time. No past medical history on file. Past Surgical History:   Procedure Laterality Date    HX APPENDECTOMY           No family history on file. Social History     Socioeconomic History    Marital status:      Spouse name: Not on file    Number of children: Not on file    Years of education: Not on file    Highest education level: Not on file   Occupational History    Not on file   Tobacco Use    Smoking status: Current Every Day Smoker     Packs/day: 0.75    Smokeless tobacco: Never Used   Substance and Sexual Activity    Alcohol use: Yes    Drug use: Never    Sexual activity: Not on file   Other Topics Concern    Not on file   Social History Narrative    Not on file     Social Determinants of Health     Financial Resource Strain:     Difficulty of Paying Living Expenses: Not on file   Food Insecurity:     Worried About Running Out of Food in the Last Year: Not on file    Ashish of Food in the Last Year: Not on file   Transportation Needs:     Lack of Transportation (Medical): Not on file    Lack of Transportation (Non-Medical):  Not on file   Physical Activity:     Days of Exercise per Week: Not on file    Minutes of Exercise per Session: Not on file   Stress:     Feeling of Stress : Not on file   Social Connections:     Frequency of Communication with Friends and Family: Not on file    Frequency of Social Gatherings with Friends and Family: Not on file    Attends Taoist Services: Not on file    Active Member of Clubs or Organizations: Not on file    Attends Club or Organization Meetings: Not on file    Marital Status: Not on file   Intimate Partner Violence:     Fear of Current or Ex-Partner: Not on file    Emotionally Abused: Not on file    Physically Abused: Not on file    Sexually Abused: Not on file   Housing Stability:     Unable to Pay for Housing in the Last Year: Not on file    Number of [place] in the Last Year: Not on file    Unstable Housing in the Last Year: Not on file         ALLERGIES: Patient has no known allergies. Review of Systems   Constitutional: Negative for chills and fever. Respiratory: Negative for shortness of breath. Cardiovascular: Negative for chest pain. Musculoskeletal: Negative for myalgias. Skin: Positive for rash. Neurological: Negative for headaches. All other systems reviewed and are negative. Vitals:    11/23/21 1349   BP: 137/86   Pulse: 88   Resp: 18   Temp: 97.5 °F (36.4 °C)   SpO2: 99%            Physical Exam  Vitals and nursing note reviewed. Constitutional:       Appearance: He is not toxic-appearing or diaphoretic. Comments: Afebrile. HENT:      Head: Normocephalic. Right Ear: External ear normal.      Left Ear: External ear normal.   Eyes:      Conjunctiva/sclera: Conjunctivae normal.   Neck:      Comments: Good ROM neck laterally left and right with upward and downward gaze. No focal tenderness along spine. Scattered somewhat erythematous lesions as visible below with central pustules to various areas. Effects bilaterally along jaw at location of beard, posterior neck and up into hairline. Lesions noted to be small, somewhat tender. Cardiovascular:      Rate and Rhythm: Normal rate. Pulmonary:      Effort: Pulmonary effort is normal. No respiratory distress.    Abdominal:      General: There is no distension. Musculoskeletal:         General: No swelling or deformity. Cervical back: Normal range of motion. Skin:     General: Skin is warm and dry. Neurological:      Mental Status: He is alert and oriented to person, place, and time. Mental status is at baseline. MDM  Number of Diagnoses or Management Options     Amount and/or Complexity of Data Reviewed  Discuss the patient with other providers: yes (Dr Justin Patel, ED attending)           Procedures               VITAL SIGNS:  Vitals:    11/23/21 1349   BP: 137/86   Pulse: 88   Resp: 18   Temp: 97.5 °F (36.4 °C)   SpO2: 99%         LABS:  No results found for this or any previous visit (from the past 24 hour(s)). IMAGING:  No orders to display         Medications During Visit:  Medications - No data to display      DECISION MAKING:    Letty Whitley is a 42 y.o. male who comes in as above. Presents afebrile and hemodynamically stable with lesions as visible above. He has good ROM neck, is afebrile without fevers at home or generalized myalgias indicative of further systemic or septic infection. Will treat with course of clindamycin and topical antifungal and have follow-up with his dermatologist.  I have discussed care with ED attending. Pt and I have discussed close return precautions as well as follow up recommendations. Opportunity for questions presented. Pt verbalizes their understanding and agreement with care plan. IMPRESSION:  1. Rash and other nonspecific skin eruption        DISPOSITION:  Discharged      Current Discharge Medication List      START taking these medications    Details   clindamycin (CLEOCIN) 150 mg capsule Take 3 Capsules by mouth three (3) times daily for 7 days. Qty: 63 Capsule, Refills: 0  Start date: 11/23/2021, End date: 11/30/2021      ketoconazole (NIZORAL) 2 % topical cream Apply  to affected area daily.   Qty: 15 g, Refills: 0  Start date: 11/23/2021              Follow-up Information Follow up With Specialties Details Why Contact Info    [facility]  Schedule an appointment as soon as possible for a visit   [address] [facility] [address] [address]  [phone]    [address] [facility] Emergency Medicine Go to  As needed, If symptoms worsen, if onset of fevers, new or other concerning symptoms [address]   [phone]            The patient is asked to follow-up with their primary care provider and any other physicians as above. We have discussed strict return precautions and the patient is asked to return promptly for any increased concerns or worsening of symptoms and for return precautions regarding their symptoms today. They can return to this emergency department or any other emergency department. I have discussed with them results as above and presented opportunity for questions. They verbalize their understanding of the above and agreement with care plan. Please note that this dictation was completed with [employer], the computer voice recognition software. Quite often unanticipated grammatical, syntax, homophones, and other interpretive errors are inadvertently transcribed by the computer software. Please disregard these errors. Please excuse any errors that have escaped final proofreading.

## 2021-11-23 NOTE — ED TRIAGE NOTES
Patient reports headache for one month. Denies vomiting. Rash, red raised, painful bumps to face and neck at hairline.

## 2022-03-10 ENCOUNTER — HOSPITAL ENCOUNTER (EMERGENCY)
Age: 42
Discharge: HOME | End: 2022-03-10
Attending: EMERGENCY MEDICINE
Payer: COMMERCIAL

## 2022-03-10 ENCOUNTER — APPOINTMENT (OUTPATIENT)
Dept: GENERAL RADIOLOGY | Age: 42
End: 2022-03-10
Attending: EMERGENCY MEDICINE
Payer: COMMERCIAL

## 2022-03-10 VITALS
OXYGEN SATURATION: 98 % | DIASTOLIC BLOOD PRESSURE: 80 MMHG | TEMPERATURE: 98.4 F | HEART RATE: 86 BPM | RESPIRATION RATE: 16 BRPM | SYSTOLIC BLOOD PRESSURE: 140 MMHG

## 2022-03-10 DIAGNOSIS — L03.019 FELON OF FINGER: Primary | ICD-10-CM

## 2022-03-10 DIAGNOSIS — B35.0 TINEA CAPITIS: ICD-10-CM

## 2022-03-10 LAB
ALBUMIN SERPL-MCNC: 3.9 G/DL (ref 3.5–5)
ALBUMIN/GLOB SERPL: 0.8 {RATIO} (ref 1.1–2.2)
ALP SERPL-CCNC: 108 U/L (ref 45–117)
ALT SERPL-CCNC: 13 U/L (ref 12–78)
ANION GAP SERPL CALC-SCNC: 3 MMOL/L (ref 5–15)
AST SERPL-CCNC: 13 U/L (ref 15–37)
BASOPHILS # BLD: 0.1 K/UL (ref 0–0.1)
BASOPHILS NFR BLD: 0 % (ref 0–1)
BILIRUB SERPL-MCNC: 0.2 MG/DL (ref 0.2–1)
BUN SERPL-MCNC: 10 MG/DL (ref 6–20)
BUN/CREAT SERPL: 14 (ref 12–20)
CALCIUM SERPL-MCNC: 9.4 MG/DL (ref 8.5–10.1)
CHLORIDE SERPL-SCNC: 108 MMOL/L (ref 97–108)
CO2 SERPL-SCNC: 28 MMOL/L (ref 21–32)
CREAT SERPL-MCNC: 0.74 MG/DL (ref 0.7–1.3)
DIFFERENTIAL METHOD BLD: ABNORMAL
EOSINOPHIL # BLD: 0.2 K/UL (ref 0–0.4)
EOSINOPHIL NFR BLD: 1 % (ref 0–7)
ERYTHROCYTE [DISTWIDTH] IN BLOOD BY AUTOMATED COUNT: 13.8 % (ref 11.5–14.5)
GLOBULIN SER CALC-MCNC: 4.7 G/DL (ref 2–4)
GLUCOSE SERPL-MCNC: 90 MG/DL (ref 65–100)
HCT VFR BLD AUTO: 44.9 % (ref 36.6–50.3)
HGB BLD-MCNC: 13.8 G/DL (ref 12.1–17)
IMM GRANULOCYTES # BLD AUTO: 0.1 K/UL (ref 0–0.04)
IMM GRANULOCYTES NFR BLD AUTO: 1 % (ref 0–0.5)
LACTATE SERPL-SCNC: 1.6 MMOL/L (ref 0.4–2)
LYMPHOCYTES # BLD: 3.3 K/UL (ref 0.8–3.5)
LYMPHOCYTES NFR BLD: 21 % (ref 12–49)
MCH RBC QN AUTO: 26.7 PG (ref 26–34)
MCHC RBC AUTO-ENTMCNC: 30.7 G/DL (ref 30–36.5)
MCV RBC AUTO: 86.8 FL (ref 80–99)
MONOCYTES # BLD: 1.3 K/UL (ref 0–1)
MONOCYTES NFR BLD: 8 % (ref 5–13)
NEUTS SEG # BLD: 11.1 K/UL (ref 1.8–8)
NEUTS SEG NFR BLD: 69 % (ref 32–75)
NRBC # BLD: 0 K/UL (ref 0–0.01)
NRBC BLD-RTO: 0 PER 100 WBC
PLATELET # BLD AUTO: 286 K/UL (ref 150–400)
PMV BLD AUTO: 9 FL (ref 8.9–12.9)
POTASSIUM SERPL-SCNC: 3.5 MMOL/L (ref 3.5–5.1)
PROT SERPL-MCNC: 8.6 G/DL (ref 6.4–8.2)
RBC # BLD AUTO: 5.17 M/UL (ref 4.1–5.7)
SODIUM SERPL-SCNC: 139 MMOL/L (ref 136–145)
WBC # BLD AUTO: 16.1 K/UL (ref 4.1–11.1)

## 2022-03-10 PROCEDURE — 80053 COMPREHEN METABOLIC PANEL: CPT

## 2022-03-10 PROCEDURE — 96365 THER/PROPH/DIAG IV INF INIT: CPT

## 2022-03-10 PROCEDURE — 74011250636 HC RX REV CODE- 250/636: Performed by: EMERGENCY MEDICINE

## 2022-03-10 PROCEDURE — 85025 COMPLETE CBC W/AUTO DIFF WBC: CPT

## 2022-03-10 PROCEDURE — 87040 BLOOD CULTURE FOR BACTERIA: CPT

## 2022-03-10 PROCEDURE — 96375 TX/PRO/DX INJ NEW DRUG ADDON: CPT

## 2022-03-10 PROCEDURE — 36415 COLL VENOUS BLD VENIPUNCTURE: CPT

## 2022-03-10 PROCEDURE — 83605 ASSAY OF LACTIC ACID: CPT

## 2022-03-10 PROCEDURE — 87185 SC STD ENZYME DETCJ PER NZM: CPT

## 2022-03-10 PROCEDURE — 87077 CULTURE AEROBIC IDENTIFY: CPT

## 2022-03-10 PROCEDURE — 74011000258 HC RX REV CODE- 258: Performed by: EMERGENCY MEDICINE

## 2022-03-10 PROCEDURE — 87205 SMEAR GRAM STAIN: CPT

## 2022-03-10 PROCEDURE — 87147 CULTURE TYPE IMMUNOLOGIC: CPT

## 2022-03-10 PROCEDURE — 74011000250 HC RX REV CODE- 250: Performed by: EMERGENCY MEDICINE

## 2022-03-10 PROCEDURE — 75810000289 HC I&D ABSCESS SIMP/COMP/MULT

## 2022-03-10 PROCEDURE — 73140 X-RAY EXAM OF FINGER(S): CPT

## 2022-03-10 PROCEDURE — 99284 EMERGENCY DEPT VISIT MOD MDM: CPT

## 2022-03-10 RX ORDER — IBUPROFEN 600 MG/1
600 TABLET ORAL
Qty: 20 TABLET | Refills: 0 | Status: SHIPPED | OUTPATIENT
Start: 2022-03-10 | End: 2022-06-13

## 2022-03-10 RX ORDER — HYDROCODONE BITARTRATE AND ACETAMINOPHEN 5; 325 MG/1; MG/1
1 TABLET ORAL
Qty: 8 TABLET | Refills: 0 | Status: SHIPPED | OUTPATIENT
Start: 2022-03-10 | End: 2022-03-13

## 2022-03-10 RX ORDER — KETOCONAZOLE 20 MG/ML
5 SHAMPOO TOPICAL EVERY OTHER DAY
Qty: 120 ML | Refills: 0 | Status: SHIPPED | OUTPATIENT
Start: 2022-03-10 | End: 2022-06-13

## 2022-03-10 RX ORDER — LIDOCAINE HYDROCHLORIDE 10 MG/ML
6 INJECTION INFILTRATION; PERINEURAL
Status: COMPLETED | OUTPATIENT
Start: 2022-03-10 | End: 2022-03-10

## 2022-03-10 RX ORDER — KETOROLAC TROMETHAMINE 30 MG/ML
30 INJECTION, SOLUTION INTRAMUSCULAR; INTRAVENOUS ONCE
Status: COMPLETED | OUTPATIENT
Start: 2022-03-10 | End: 2022-03-10

## 2022-03-10 RX ORDER — CEPHALEXIN 500 MG/1
500 CAPSULE ORAL 3 TIMES DAILY
Qty: 21 CAPSULE | Refills: 0 | Status: SHIPPED | OUTPATIENT
Start: 2022-03-10 | End: 2022-03-17

## 2022-03-10 RX ADMIN — LIDOCAINE HYDROCHLORIDE 6 ML: 10 INJECTION, SOLUTION INFILTRATION; PERINEURAL at 14:17

## 2022-03-10 RX ADMIN — CEFAZOLIN 1 G: 1 INJECTION, POWDER, FOR SOLUTION INTRAMUSCULAR; INTRAVENOUS at 14:18

## 2022-03-10 RX ADMIN — SODIUM CHLORIDE 1000 ML: 9 INJECTION, SOLUTION INTRAVENOUS at 13:28

## 2022-03-10 RX ADMIN — KETOROLAC TROMETHAMINE 30 MG: 30 INJECTION, SOLUTION INTRAMUSCULAR; INTRAVENOUS at 13:28

## 2022-03-10 NOTE — Clinical Note
[address]  [address] [facility] [address]  [phone]    Work/School Note    Date: 3/10/2022    To Whom It May concern:    Jason Kenny was seen and treated today in the emergency room by the following provider(s):  Attending Provider: Remi Chappell MD.      Jason Kenny is excused from work/school on 3/10/2022 through 3/12/2022. He is medically clear to return to work/school on 3/13/2022.          Sincerely,          Heather Stewart MD

## 2022-03-10 NOTE — CONSULTS
Ortho Hand Consult Note      Patient: Julieta Cam                   MRN: [id number]  Sex: male  YOB: 1980           Age: 42 y.o. Reason For Consult: Right thumb infection    History of Present Illness: 34yo male with 3 days history of right thumb pain and swelling. Does not recall specific injury. Admits to biting his fingernails. Initially had symptoms localized to the thumbnail but this has gotten progressively worse and now involves most of the thumb. Has tightness/tenderness over finger tuft and spreading redness proximally. He denies previous similar episodes. Denies fever/chills at home. Has tried tylenol and advil w/o relief    Social History            Socioeconomic History    Marital status:        Spouse name: Not on file    Number of children: Not on file    Years of education: Not on file    Highest education level: Not on file   Occupational History    Not on file   Tobacco Use    Smoking status: Current Every Day Smoker       Packs/day: 0.75    Smokeless tobacco: Never Used   Substance and Sexual Activity    Alcohol use: Yes    Drug use: Never    Sexual activity: Not on file   Other Topics Concern    Not on file   Social History Narrative    Not on file       ALLERGIES: Patient has no known allergies.      Visit Vitals  BP (!) 144/85 (BP 1 Location: Left upper arm, BP Patient Position: At rest)   Pulse (!) 106   Temp 98.5 °F (36.9 °C)   Resp 18   SpO2 98%        Lab Results:  HGB   Date/Time Value Ref Range Status   03/10/2022 01:18 PM 13.8 12.1 - 17.0 g/dL Final       Physical Exam:  Healthy appearing 34yo male, NAD, complains of right humb pain, alert/oriented, normal mood, right thumb examined, obvious paronychia, thumbnail edges appear full of pus, tuft of thumb tight and very sensitive, erythema extending proximally to 1st MCP joint, active thumb ROM intact but painful, NVI            Xrays:  XR THUMB RT MIN 2 V 3/10/2022 13:25  INDICATION: Trauma. COMPARISON: None. FINDINGS: Three views of the right thumb demonstrate no fracture or other acute  osseous or articular abnormality. There is soft tissue swelling in the region of  the distal phalanx. . There is no radiopaque foreign body or gas in the soft  tissues. IMPRESSION  Soft tissue swelling. No bony abnormalities. No radiopaque foreign  body. .    Assessment:   Right thumb paronychia, right thumb felon    Plan:  Discussed with ortho attending, Dr. Abundio Dias. Recommend bedside I&D and decompression of right thumb felon. Discussed with patient. He is in agreement. 1 gram IV Ancef given. Procedure performed without immediate complication (see procedure note). Obvious purulence encountered and sent for culture. Can be discharged with oral antibiotics and pain medicine. Follow-up with hand surgery next week.     [address] [place] [address]  3/10/2022   3:42 PM      .

## 2022-03-10 NOTE — Clinical Note
[address]  [address] [facility] [address]  [phone]    Work/School Note    Date: 3/10/2022    To Whom It May concern:      Levy Dye was seen and treated today in the emergency room by the following provider(s):  Attending Provider: Trip Espinosa MD.      Levy Dye is excused from work/school on 03/10/22. He is clear to return to work/school on 03/11/22.         Sincerely,          Renee Martell MD

## 2022-03-10 NOTE — PROCEDURES
Incision/Drainage Procedure Note    Performed By:  MENG De Jesus     Assistant:  none    Anesthesia: Local     Procedure Details: The risks,benefits and alternatives were explained and verbal consent was obtained for the procedure. RBAs also explained. The area was cleansed with Betadine and draped in the usual manner. Local anesthesia with 1% lidocaine was used to perform a digital block on the right thumb and infiltrate the skin overlying the abscess. An incision using a #15 blade was made. A Moderate amount of pus was obtained. A culture was obtained. The loculations and crypts within the wound were broken up with hemostat. The wound was irrigated with isoto amy saline. The wound was packed with iodoform gauze. A sterile dressing was then applied. Estimated Blood Loss:  Minimal           Complications:  None; patient tolerated the procedure well.            Condition: stable           [address] [place] [address]   3/10/22  3:40

## 2022-03-10 NOTE — ED PROVIDER NOTES
48-year-old male with no significant past medical history presents with complaints of 3 days right thumb pain and swelling. Patient reports pain initially starting around nail bed and then progressing to whole thumb. Denies fever, chills, nausea, vomiting, chest pain, shortness of breath. Patient also reports that he has had a rash to scalp and face under facial hair x3 months. Patient reports rash is improving. Seen in emergency department November 2021 for scalp and facial hair rash, given clindamycin and ketoconazole cream at that time. Regular tobacco use  Regular alcohol use  Denies drug use           History reviewed. No pertinent past medical history. Past Surgical History:   Procedure Laterality Date    HX APPENDECTOMY           History reviewed. No pertinent family history. Social History     Socioeconomic History    Marital status:      Spouse name: Not on file    Number of children: Not on file    Years of education: Not on file    Highest education level: Not on file   Occupational History    Not on file   Tobacco Use    Smoking status: Current Every Day Smoker     Packs/day: 0.75    Smokeless tobacco: Never Used   Substance and Sexual Activity    Alcohol use: Yes    Drug use: Never    Sexual activity: Not on file   Other Topics Concern    Not on file   Social History Narrative    Not on file     Social Determinants of Health     Financial Resource Strain:     Difficulty of Paying Living Expenses: Not on file   Food Insecurity:     Worried About Running Out of Food in the Last Year: Not on file    Ashish of Food in the Last Year: Not on file   Transportation Needs:     Lack of Transportation (Medical): Not on file    Lack of Transportation (Non-Medical):  Not on file   Physical Activity:     Days of Exercise per Week: Not on file    Minutes of Exercise per Session: Not on file   Stress:     Feeling of Stress : Not on file   Social Connections:     Frequency of Communication with Friends and Family: Not on file    Frequency of Social Gatherings with Friends and Family: Not on file    Attends Nondenominational Services: Not on file    Active Member of Clubs or Organizations: Not on file    Attends Club or Organization Meetings: Not on file    Marital Status: Not on file   Intimate Partner Violence:     Fear of Current or Ex-Partner: Not on file    Emotionally Abused: Not on file    Physically Abused: Not on file    Sexually Abused: Not on file   Housing Stability:     Unable to Pay for Housing in the Last Year: Not on file    Number of [place] in the Last Year: Not on file    Unstable Housing in the Last Year: Not on file         ALLERGIES: Patient has no known allergies. Review of Systems   Constitutional: Negative for chills and fever. HENT: Negative for congestion, nosebleeds and sore throat. Eyes: Negative for pain and discharge. Respiratory: Negative for cough and shortness of breath. Cardiovascular: Negative for chest pain and palpitations. Gastrointestinal: Negative for abdominal pain, constipation, nausea and vomiting. Genitourinary: Negative for decreased urine volume, dysuria, flank pain and urgency. Musculoskeletal: Negative for gait problem and myalgias. Skin: Positive for color change and rash. Negative for wound. Neurological: Negative for seizures and syncope. Hematological: Does not bruise/bleed easily. Psychiatric/Behavioral: Negative for confusion, self-injury and suicidal ideas. Vitals:    03/10/22 1248   BP: (!) 144/85   Pulse: (!) 106   Resp: 18   Temp: 98.5 °F (36.9 °C)   SpO2: 98%            Physical Exam  Vitals and nursing note reviewed. Constitutional:       Appearance: He is well-developed. HENT:      Head: Normocephalic and atraumatic. Eyes:      Pupils: Pupils are equal, round, and reactive to light. Cardiovascular:      Rate and Rhythm: Normal rate and regular rhythm.       Heart sounds: Normal heart sounds. Pulmonary:      Effort: Pulmonary effort is normal. No respiratory distress. Breath sounds: Normal breath sounds. No wheezing. Abdominal:      General: Bowel sounds are normal.      Palpations: Abdomen is soft. Tenderness: There is no abdominal tenderness. There is no guarding or rebound. Musculoskeletal:      Right hand: Swelling and tenderness present. Decreased range of motion. Cervical back: Normal range of motion and neck supple. Comments: Right thumb with tenderness to palpation over distal pulp, soft tissue swelling, decreased range of motion secondary to pain. Red streak noted on dorsal hand over her first metacarpal to wrist.  See picture. Skin:     General: Skin is warm and dry. Neurological:      Mental Status: He is alert and oriented to person, place, and time. Psychiatric:         Behavior: Behavior normal.          MDM  Number of Diagnoses or Management Options  Diagnosis management comments: 40-year-old male, right-hand-dominant, presents with complaints of right thumb pain and swelling. Patient is afebrile, nontoxic, hemodynamically stable, afebrile, right thumb with swelling and decreased range of motion secondary to pain, tenderness to palpation over distal pulp. Plan-pain control, x-ray, CBC/CMP, consult orthopedics. Clinical impression consistent with felon. 1:23 PM  Rigoberto Moreno MD spoke with Dr. Mark Severino for Orthopedics. Discussed available diagnostic tests and clinical findings. He/She is in agreement with care plans as outlined. He/she advised MENG Cavazos will drain felon in ER. Give 1g ancef. Procedures              3:38 PM  Drainage completed by leslee Cavazos. Advised discharge with pain medication, antibiotics and follow-up with Dr. Marco Dempsey. 3:39 PM  Patient's results have been reviewed with them.   Patient and/or family have verbally conveyed their understanding and agreement of the patient's signs, symptoms, diagnosis, treatment and prognosis and additionally agree to follow up as recommended or return to the Emergency Room should their condition change prior to follow-up. Discharge instructions have also been provided to the patient with some educational information regarding their diagnosis as well a list of reasons why they would want to return to the ER prior to their follow-up appointment should their condition change.

## 2022-03-10 NOTE — ED TRIAGE NOTES
Pt reports rash to scalp and face x3 months. Pt also reports R thumb pain and swelling near nail bed x3 days. Pt reports he does bite his nails.

## 2022-03-13 LAB
BACTERIA SPEC CULT: ABNORMAL
GRAM STN SPEC: ABNORMAL
SERVICE CMNT-IMP: ABNORMAL

## 2022-03-15 ENCOUNTER — OFFICE VISIT (OUTPATIENT)
Dept: INTERNAL MEDICINE CLINIC | Age: 42
End: 2022-03-15
Payer: COMMERCIAL

## 2022-03-15 VITALS
OXYGEN SATURATION: 98 % | HEIGHT: 67 IN | WEIGHT: 200.4 LBS | BODY MASS INDEX: 31.45 KG/M2 | RESPIRATION RATE: 16 BRPM | TEMPERATURE: 98.4 F | HEART RATE: 84 BPM | DIASTOLIC BLOOD PRESSURE: 79 MMHG | SYSTOLIC BLOOD PRESSURE: 118 MMHG

## 2022-03-15 DIAGNOSIS — Z23 ENCOUNTER FOR IMMUNIZATION: ICD-10-CM

## 2022-03-15 DIAGNOSIS — F17.200 CURRENT SMOKER: ICD-10-CM

## 2022-03-15 DIAGNOSIS — Z11.59 NEED FOR HEPATITIS C SCREENING TEST: ICD-10-CM

## 2022-03-15 DIAGNOSIS — L73.8 INFECTIOUS FOLLICULITIS: ICD-10-CM

## 2022-03-15 DIAGNOSIS — I10 HYPERTENSION, UNSPECIFIED TYPE: ICD-10-CM

## 2022-03-15 DIAGNOSIS — Z11.59 SCREENING FOR VIRAL DISEASE: ICD-10-CM

## 2022-03-15 DIAGNOSIS — Z00.00 ROUTINE ADULT HEALTH MAINTENANCE: Primary | ICD-10-CM

## 2022-03-15 DIAGNOSIS — B99.9 INFECTIOUS FOLLICULITIS: ICD-10-CM

## 2022-03-15 PROBLEM — L03.011 FELON OF FINGER OF RIGHT HAND: Status: ACTIVE | Noted: 2022-03-01

## 2022-03-15 LAB
BACTERIA SPEC CULT: NORMAL
SERVICE CMNT-IMP: NORMAL

## 2022-03-15 PROCEDURE — 99204 OFFICE O/P NEW MOD 45 MIN: CPT | Performed by: INTERNAL MEDICINE

## 2022-03-15 RX ORDER — MUPIROCIN 20 MG/G
OINTMENT TOPICAL 3 TIMES DAILY
Qty: 22 G | Refills: 0 | Status: SHIPPED | OUTPATIENT
Start: 2022-03-15 | End: 2022-03-29

## 2022-03-15 NOTE — PATIENT INSTRUCTIONS
Mupirocin (On the skin)   Mupirocin (mue-PIR-oh-sin)  Treats skin infections. Brand Name(s): Bactroban, Centany, Centany AT, DermacinRx Clorhexacin, DermacinRx Surgical PharmaPak, Dermawerx Surgical Plus Johnathan, NuSurgePak, Whytederm SurgiPak   There may be other brand names for this medicine. When This Medicine Should Not Be Used: This medicine is not right for everyone. Do not use it if you had an allergic reaction to mupirocin. How to Use This Medicine:   Cream, Ointment  · Use your medicine as directed. · Use this medicine only on your skin. Rinse it off right away if it gets on a cut or scrape. Do not get the medicine in your eyes, nose, or mouth. · Wash your hands with soap and water before and after you use this medicine. · Apply a thin layer of the medicine to the affected area. Rub it in gently. · Missed dose: Apply a dose as soon as you can. If it is almost time for your next dose, wait until then and apply a regular dose. Do not apply extra medicine to make up for a missed dose. · Store the medicine in a closed container at room temperature, away from heat, moisture, and direct light. Do not freeze. Drugs and Foods to Avoid:   Ask your doctor or pharmacist before using any other medicine, including over-the-counter medicines, vitamins, and herbal products. · Do not put cosmetics or skin care products on the treated skin. Warnings While Using This Medicine:   · Tell your doctor if you are pregnant or breastfeeding, or if you have kidney disease. · This medicine can cause diarrhea. Call your doctor if the diarrhea becomes severe, does not stop, or is bloody. Do not take any medicine to stop diarrhea until you have talked to your doctor. Diarrhea can occur 2 months or more after you stop taking this medicine. · Do not use this medicine to treat a skin problem your doctor has not examined. · Call your doctor if your symptoms do not improve or if they get worse.   · Keep all medicine out of the reach of children. Never share your medicine with anyone. Possible Side Effects While Using This Medicine:   Call your doctor right away if you notice any of these side effects:  · Allergic reaction: Itching or hives, swelling in your face or hands, swelling or tingling in your mouth or throat, chest tightness, trouble breathing  · Severe diarrhea that may be bloody, stomach cramps or pain  · Severe skin rash, burning, or itching  If you notice these less serious side effects, talk with your doctor:   · Dry skin  · Mild diarrhea  · Mild stinging or burning where you apply the medicine  If you notice other side effects that you think are caused by this medicine, tell your doctor. Call your doctor for medical advice about side effects. You may report side effects to FDA at 3-290-XYE-2645  © 2017 [facility] Information is for End User's use only and may not be sold, redistributed or otherwise used for commercial purposes. The above information is an  only. It is not intended as medical advice for individual conditions or treatments. Talk to your doctor, nurse or pharmacist before following any medical regimen to see if it is safe and effective for you. Folliculitis: Care Instructions  Overview     Folliculitis (say \"hld-SMC-hls-LY-tus\") is an inflammation of the pouches (follicles) in the skin where hair grows. It can occur on any part of the body, but it is most common on the scalp, face, armpits, and groin. Bacteria, such as those found in a hot tub, can cause folliculitis. But folliculitis can also be caused by other organisms, such as fungi or parasites. Folliculitis begins as a red, tender area near a strand of hair. The skin can itch or burn and may drain pus or blood. Sometimes folliculitis can lead to more serious skin infections. Your doctor usually can treat mild folliculitis with an antibiotic cream or ointment.  If you have folliculitis on your scalp, you may use a medicated shampoo. Antibiotics you take as pills can treat infections deeper in the skin. Other treatments that may be used include antifungal and antiparasitic medicines. Folliculitis may be caused by ingrown hairs from shaving. One solution is to stop shaving. If that isn't an option, using an electric razor that doesn't shave so close may help. Laser treatment may also be an option. Laser treatment destroys the hair follicle so hair will no longer grow in the treated area. Follow-up care is a key part of your treatment and safety. Be sure to make and go to all appointments, and call your doctor if you are having problems. It's also a good idea to know your test results and keep a list of the medicines you take. How can you care for yourself at home? · Take your medicine exactly as prescribed. If your doctor prescribed antibiotics, take them as directed. Do not stop taking them just because you feel better. You need to take the full course of antibiotics. · To help with itching or pain, put a warm, moist cloth (like a clean washcloth) on the area for 5 to 10 minutes, 3 to 6 times a day. · Do not share your razor, towel, or washcloth. That can spread folliculitis. · If folliculitis is caused by shaving, try to avoid shaving for at least a month. If that isn't an option, use an electric razor that doesn't shave so close. Or if you need a clean shave, use shaving cream or gel and always shave in the direction that the hair grows. When should you call for help? Call your doctor now or seek immediate medical care if:    · You have symptoms of infection, such as:  ? Increased pain, swelling, warmth, or redness. ? Red streaks leading from the area. ? Pus draining from the area. ? A fever. Watch closely for changes in your health, and be sure to contact your doctor if:    · You do not get better as expected. Where can you learn more?   Go to [email or web address]  Enter M257 in the search box to learn more about \"Folliculitis: Care Instructions. \"  Current as of: November 15, 2021               Content Version: 13.2  © 8630-2054 [employer]. Care instructions adapted under license by [employer] (which disclaims liability or warranty for this information). If you have questions about a medical condition or this instruction, always ask your healthcare professional. [address] any warranty or liability for your use of this information. ÍãíÉ DASH: ÅÑÔÇÏÇÊ ÇáÑÚÇíÉ  DASH Diet: Care Instructions  ÅÑÔÇÏÇÊ ÇáÑÚÇíÉ ÇáÎÇÕÉ Èß  ÊõÚÏ ÍãíÉ DASH ÎØÉ ØÚÇã ÊÓÇÚÏ Úáì ÊÞáíá ÖÛØ ÇáÏã. æáÝÙÉ DASH ÊÚäí \"Dietary Approaches to Stop Hypertension\" Dimitri Cindy EWOKR APXSFJE ÇáØÑÞ ÇáÛÐÇÆíÉ áæÞÝ ÇÑÊÝÇÚ ÇáÖÛØ. æÇÑÊÝÇÚ ÇáÖÛØ íÚäí ÇÑÊÝÇÚ ÖÛØ ÇáÏã. æÊÑßÒ åÐå ÇáÍãíÉ Úáì ÊäÇæá ÇáÃØÚãÉ ÇáÊí ÊÍÊæí Úáì ãÞÏÇÑ ãÑÊÝÚ ãä ÇáßÇáÓíæã æÇáÈæÊÇÓíæã æÇáãÇÛäÓíæã. ÝåÐå ÇáãæÇÏ ÇáãÛÐíÉ íãßä Ãä ÊÞáá ÖÛØ ÇáÏã. æÊÔãá ÇáÃØÚãÉ ÇáÛäíÉ ÈåÐå ÇáãßæäÇÊ ÇáÛÐÇÆíÉ ÇáÝæÇßå XHVBRZJLVJ æãäÊÌÇÊ ÇáÃáÈÇä ÞáíáÉ ÇáÏÓã æÇáÈäÏÞ æÇáÈÐæÑ æÇáÈÞá. æÃãÇ ÊäÇæá TDBLGYYP ÇáÛÐÇÆíÉ ÇáÊí ÊÍÊæí Úáì ÇáßÇáÓíæã IIGXXOWKUXB æÇáãÇÛäÓíæã ÈÏáÇð ãä ÇáÃØÚãÉ ÇáÊí ÊÍÊæí Úáì ãÞÏÇÑ ãÑÊÝÚ ãäåÇ áÇ íÄËÑ äÝÓ ÇáÊÃËíÑ. æÊÊÖãä ÍãíÉ DASH ÇáÍÈæÈ LKDXDVC æÇáÓãß æÇáÏæÇÌä. æåÐå ÇáÍãíÉ æÇÍÏÉ ãä ÇáÊÛííÑÇÊ ÇáÊí ÊØÑÃ Úáì ÃÓáæÈ ÇáÍíÇÉ ÇáÊí íãßä Ãä íäÕÍ ÈåÇ ÇáØÈíÈ áÊÞáíá ÖÛØ ÇáÏã ÇáãÑÊÝÚ. æÞÏ íÎÊÇÑ ÇáØÈíÈ ÃíÖðÇ ÊÞáíá ãÞÏÇÑ ÇáÕæÏíæã Ýí äÙÇãß ÇáÛÐÇÆí. æíãßä áÊÞáíá ÇáÕæÏíæã ÃËäÇÁ ÇÊÈÇÚ ÍãíÉ DASH ÈÊÞáíá ÖÛØ ÇáÏã ÃßËÑ ãä VGXZKBMI Úáì åÐå ÇáÍãíÉ æÍÏåÇ. ÊõÚÏ ÑÚÇíÉ ÇáãÊÇÈÚÉ ÌÒÁðÇ ÃÓÇÓíðÇ Ýí ÚáÇÌß æÓáÇãÊß. ÝÚáíß ÇáÍÑÕ Úáì ÊÑÊíÈ ÌãíÚ ãæÇÚíÏ ÒíÇÑÉ ÇáØÈíÈ ZLHBZPULG ÈåÇ¡ XIRBNCZL ÈØÈíÈß ÚäÏ KMVTOVKJ ãä Ãí ãÔßáÇÊ. æãä ÇáÌíÏ ÃíÖðÇ Ãä ÊÚÑÝ äÊÇÆÌ ICJJWRCH æßÐáß HAQVRWUD ÈÞÇÆãÉ ÇáÃÏæíÉ ÇáÊí ATCZBBRU. ßíÝ íãßäß ÇáÇÚÊäÇÁ ÈäÝÓß Ýí BLITQJ¿  ÇÊÈÇÚ ÍãíÉ DASH   ÊäÇæá 4 Åáì 5 æÌÈÇÊ ãä ÇáÝæÇßå íæãíðÇ.  æÊÊßæä ÇáæÌÈÉ ãä ÞØÚÉ ÝÇßåÉ ãÊæÓØÉ ÇáÍÌã æ½ ßæÈ ãä ÇáÝÇßåÉ ÇáãÝÑæãÉ Ãæ IWPJJRZBV¡ æ1/4 ßæÈ ãä ÇáÝÇßåÉ ÇáãÌÝÝÉ Ãæ 4 ÃæäÕÇÊ (½ ßæÈ) ãä ÚÕíÑ ÇáÝÇßåÉ. ÇÎÊÑ ËãÑÇÊ ÇáÝæÇßå ÃßËÑ ãä ÚÕíÑ ÇáÝæÇßå.  ÊäÇæá 4 Åáì 5 æÌÈÇÊ ãä CGRVJBTTE íæãíðÇ. ÊÖã ÇáæÌÈÉ ßæÈ ãä ÇáÎÓ Ãæ CHNUTTSTG ÇáÎÖÑÇÁ ÇáäíÆÉ¡ æ½ ßæÈ ãä UKQKSWENK UWIBAFXS Ãæ COJLVDGT¡ Ãæ 4 ÃæäÕÇÊ (½ ßæÈ) ãä ÚÕíÑ TBWLHWOEX. ÇÎÊÑ ËãÑÇÊ GVZGELLMT ÃßËÑ ãä ÚÕíÑ YWBRZPJQZ.  ÊäÇæá æÌÈÊíä Ãæ ËáÇËÉ ãä ãäÊÌÇÊ ÇáÃáÈÇä ÞáíáÉ ÇáÏÓã Ãæ ÇáÎÇáíÉ ãäå íæãíðÇ. æÊÊßæä ÇáæÌÈÉ ãä 8 ÃæäÕÇÊ ãä ÇááÈä Ãæ ßæÈ ÒÈÇÏí Ãæ 1 ½ ÃæäÕÉ ãä ÇáÌÈä.  ÊäÇæá 6 Åáì 8 æÌÈÇÊ ãä ÇáÍÈæÈ íæãíðÇ. æÊÊßæä ÇáæÌÈÉ ãä ÔÑíÍÉ ÎÈÒ AYOHX Ãæ ßæÈ æÇÍÏ ãä ÇáÍÈæÈ ÇáÌÇåÒÉ ááÃßá ÇáÌÇÝÉ Ãæ ½ ßæÈ ãä ÇáÃÑÒ ÇáãØåí Ãæ KYUSEDYTN Ãæ DXEMMB ÇáÌÇåÒÉ ááÃßá ÇáãØåæÉ. ÍÇæá ÇÎÊíÇÑ ãäÊÌÇÊ ÇáÍÈæÈ BJLKTJH ßËíÑðÇ ÞÏÑ ÇáãÓÊØÇÚ.  Þáøá ãä ÇááÍã ÇáÎÇáí ãä ÇáÏåæä QLMIFQWR æÇáÓãß Åáì æÌÈÊíä íæãíðÇ. æÃãÇ ãÞÏÇÑ ÇáæÌÈÉ ÝÜ 3 ÃæÞíÇÊ ÈÞÏÑ ÍÌã ãÌãæÚÉ ãä æÑÞ ÇááÚÈ.  ÊäÇæá 4 Åáì 5 æÌÈÇÊ ãä ÇáÈäÏÞ æÇáÈÐæÑ æÇáÈÞá (UYPGLXNRE ÇáÌÇÝÉ ÇáãØåæÉ¡ æÇáÚÏÓ NOPGGOZ ÇáãÌÑæÔÉ) ßá ÃÓÈæÚ. æÊÊßæä ÇáæÌÈÉ ãä 1/3 ßæÈ ãä ÇáÈäÏÞ Ãæ ãáÚÞÊíä ãä ÇáÈÐæÑ Ãæ ½ ßæÈ ãä FOMZEJVZO Ãæ RNOOOAVW ÇáãØåæÉ.  áÇ ÊÊÌÇæÒ ÊäÇæá æÌÈÊíä Åáì 3 æÌÈÇÊ ãä ÇáÏåæä æÇáÒíæÊ íæãíðÇ. RUOVZDY ãÞÏÇÑåÇ ãáÚÞÉ æÇÍÏÉ ãä ÒíÊ CSHMLWDT Ãæ ãáÚÞÊíä ãä ãÑÞ ÊæÇÈá ÇáÓáØÉ.  áÇ ÊÊÌÇæÒ ÊäÇæá ÃßËÑ ãä 5 æÌÈÇÊ Ãæ ÃÞá ãä ÇáÍáæì BMBAEM ÇáãõÖÇÝ ÃÓÈæÚíðÇ. æÊÊßæä ÇáæÌÈÉ ãä ãáÚÞÉ ãä ÇáÌíáí Ãæ ÇáãÑÈì Ãæ ½ ßæÈ ãä ÇáÔÑÈÇÊ Ãæ ßæÈ ãä ÇááíãæäÇÏÉ.  ÊäÇæá ÃÞá ãä 2,300 ãáÌã ãä ÇáÕæÏíæã íæãíðÇ. æÅÐÇ ÇÞÊÕÑ ÊäÇæáß ááÕæÏíæã Úáì 1,500 ãáÌã Ýí Çáíæã¡ íãßäß ÒíÇÏÉ ÎÝÖ ÖÛØ ÇáÏã áÏíß. äÕÇÆÍ áÊÍÞíÞ ÇáäÌÇÍ   ÇÈÏÃ ÈÔßá ÊÏÑíÌí. áÇ ÊÍÇæá ÅÌÑÇÁ ÊÛííÑÇÊ ÌÐÑíÉ Úáì äÙÇãß ÇáÛÐÇÆí ÈÔßá ãÝÇÌÆ. ÞÏ ÊÔÚÑ ÈÝæÇÊ ÇáÝÑÕÉ Ýí ÊäÇæá ÃØÚãÊß ÇáãÝÖáÉ ÝÊÒÏÇÏ ÇÍÊãÇáíÉ ÝÔáß. ÃÏÎá ÊÛííÑÇÊ ÈÓíØÉ æÇáÊÒã ÈåÇ. æÈÚÏ Ãä ÊÖÍì åÐå ÇáÊÛííÑÇÊ ÚÇÏÉð WTMUMPN¡ ÃÏÎá ÈÚÖ ÇáÊÛííÑÇÊ ÇáÅÖÇÝíÉ ÇáÃÎÑì.  ÌÑøöÈ ÈÚÖðÇ ããÇ íáí:   o ÇÌÚá ÊäÇæá ÇáÝæÇßå VUZEAWHEKO åÏÝðÇ Ýí ßá æÌÈÉ ææÌÈÉ ÎÝíÝÉ. ÝåÐÇ KDESRZ OVKXJC Úáì ÇáßãíÉ ÇáãæÕì ãä ÇáÝæÇßå GLAUBBVJVB íæãíðÇ.   o [facility] FFBGV ÇáÒÈÇÏí ÇáÐí ÊÚáæå ÇáÝÇßåÉ æÇáÈäÏÞ ßæÌÈÉ ÎÝíÝÉ Ãæ Íáæì ÕÍíÉ. o ÃÖÝ ÇáÎÓ æÇáØãÇØã æÇáÎíÇÑ XZRBSO Åáì BZGTXXTCEV. o ÇÌãÚ Èíä ØÈÞÉ NVFTTJK LLDGDWD æÌÈä ÇáãæÊÒÇÑíáÇ Þáíá ÇáÏÓã æÇáßËíÑ ãä YZZKMZYS ÇáÊí ÊÚáæåÇ. ÌÑøÈ ÇÓÊÎÏÇã ÇáØãÇØã æÇáÞÑÚ æÇáÓÈÇäÎ æÇáÈÑæßáí æÇáÌÒÑ æÇáÞÑäÈíØ æÇáÈÕá. o ÊäÇæá ãÌãæÚÉ ãÊäæÚÉ ãä ÞØÚ OSJCNRSPW ãÚ ÇáÕáÕÉ ÞáíáÉ ÇáÏÓã ßÝÇÊÍ ááÔåíÉ ÈÏáÇð ãä ÑÞÇÞÇÊ ÇáÈØÇØÓ æÇáÕáÕÉ. o ÇäËÑ ÈÐæÑ ÒåÑÉ ÇáÔãÓ Ãæ Çááíãæä ÇáãÝÑæã Úáì ÇáÓáØÉ. Ãæ ÍÇæá ÅÖÇÝÉ ÇáÌæÒ Ãæ ÇááæÒ ÇáãÝÑæã Åáì ÇáÎÖÑÇæÇÊ ÇáãØÈæÎÉ. o ÌÑøÈ ÊäÇæá ÈÚÖ æÌÈÇÊ IVWKMVRVK MJEVUENZ ÇáÈÞæáíÇÊ QLHNRHZRU. ÃÖÝ ÇáÍãøÕ Ãæ ÇááæÈíÇÁ Åáì EEFKGZ. ÇÕäÚ ÇáÈæÑíÊæ OFVLMJB JZIZSBRG áæÈíÇÁ ÈíäÊæ Ãæ OPPSGPZTE ÇáÓæÏÇÁ. Ãíä íãßäß ãÚÑÝÉ ÇáãÒíÏ¿  ÇäÊÞÇá Åáì   [email or web address]  ÃÏÎá [address] Ýí ãÑÈÚ ÇáÈÍË áãÚÑÝÉ ÇáãÒíÏ Íæá \"ÍãíÉ DASH: ÅÑÔÇÏÇÊ ÇáÑÚÇíÉ. \"  ÓÇÑò VAIQOPXS ãä: 10 ßÇäæä ÇáËÇäí 2022               äÓÎÉ ÇáãÍÊæì: 13.2  © 4336-4433 [employer]. Êã ÊÚÏíá ÅÑÔÇÏÇÊ ÇáÑÚÇíÉ ÈãæÌÈ ÊÑÎíÕ ÕÇÏÑ ãä ÇÎÊÕÇÕí ÇáÑÚÇíÉ ÇáÕÍíÉ ÇáÎÇÕ Èß. ÅÐÇ ßÇäÊ áÏíß ÃÓÆáÉ EQUBM ÈÍÇáÉ ãÑÖíÉ Ãæ ÈåÐå ÇáÊÚáíãÇÊ¡ ÝÇÍÑÕ Úáì ÇáÑÌæÚ ÏÇÆãðÇ Åáì ÇÎÊÕÇÕí ÇáÑÚÇíÉ ÇáÕÍíÉ. ÊõÎáí ÔÑßÉ [employer] EPRPQWQMV Úä Ãí ÖãÇä Ãæ ÇáÊÒÇã íÊÚáÞ IYWDDFKYT áåÐå RHGDQACLB. ãÚáæãÇÊ Íæá ÇÑÊÝÇÚ ÖÛØ ÇáÏã  Learning About High Blood Pressure  ãÇ ÇáãÞÕæÏ ÈÇÑÊÝÇÚ ÖÛØ ÇáÏã¿    Åä ÖÛØ ÇáÏã íÚäí ÞíÇÓ ÞæÉ ÏÝÚ ÇáÏã Ýí ÇÊÌÇå ÌÏÑÇä ÇáÔÑÇííä. æãä ÇáØÈíÚí Ãä íÑÊÝÚ ÖÛØ ÇáÏã æíäÎÝÖ ÃËäÇÁ Çáíæã¡ æáßä ÅÐÇ Ùá ãÑÊÝÚðÇ ÝåÐÇ íÚäí Ãäß ãÕÇÈ ÈÇÑÊÝÇÚ ÖÛØ ÇáÏã. æãä ÇáÃÓãÇÁ ÇáÃÎÑì ÇáÊí ÊõØáÞ Úáíå ÃíÖðÇ ÖÛØ ÇáÏã ÇáÚÇáí. æíãßä áÞíÇÓíä ÅÎÈÇÑß ÈÇÑÊÝÇÚ ÖÛØ ÇáÏã. ÝÇáÞíÇÓ ÇáÃæá íÊãËá Ýí ÞíÇÓ ÇáÖÛØ ÇáÇäÞÈÇÖí. æåæ íÙåÑ ãÏì ÞæÉ ÏÝÚ ÇáÞáÈ ááÏã ÃËäÇÁ ÖÎå. æÃãÇ ÇáÞíÇÓ ÇáËÇäí ÝíÊãËá Ýí ÞíÇÓ ÇáÖÛØ ÇáÇäÈÓÇØí. æíÙåÑ ãÏì ÞæÉ ÇäÏÝÇÚ ÇáÏã Èíä ÖÑÈÇÊ ÇáÞáÈ ÚäÏãÇ íÓÊÑÎí ÇáÞáÈ æíãÊáÆ ÈÇáÏã. ÓíÍÏÏ áßö ØÈíÈßö äåÌðÇ áãÚÇáÌÉ ÖÛØ ÇáÏã áÏíßö. æÓíÚÊãÏ ÇáäåÌ HPBEMY áßö Úáì ÍÇáÊßö ÇáÕÍíÉ æÚãÑßö.  íõÞÕÏ ÈãÕØáÍ ÇÑÊÝÇÚ ÖÛØ ÇáÏã (ÖÛØ ÇáÏã ÇáãÑÊÝÚ) Ãä íÙá ÇáÑÞã ÇáÚáæí QLUXJNN¡ Ãæ Ãä íÙá ÇáÑÞã ÇáÓÝáí ACRLWCL¡ Ãæ ßáÇåãÇ. íõÒíÏ ÇÑÊÝÇÚ ÖÛØ ÇáÏã ãä ÎØÑ ÇáÅÕÇÈÉ ÈÇáÓßÊÉ ÇáÏãÇÛíÉ¡ CAQZMFIE ÇáÞáÈíÉ¡ ZNPDPDP ÃÎÑì. CVKOFIJZ ÃäÊö æØÈíÈßö Úä ãÏì ÎØæÑÉ åÐå POKADOUM ÈÇáäÓÈÉ áßö ÈäÇÁð Úáì ãÚÏá ÖÛØ ÇáÏã áÏíßö. ãÇ ÇáÐí íÍÏË ÚäÏ ÅÕÇÈÊßö ÈÇÑÊÝÇÚ ÖÛØ ÇáÏã¿   íÊÏÝÞ ÇáÏã ÚÈÑ ÇáÔÑÇííä ÈÞæÉ ÔÏíÏÉ. æãÚ ãÑæÑ ÇáæÞÊ¡ íÄÏí åÐÇ Åáì ÊáÝ ÌÏÑÇä ÇáÔÑÇííä. ÅáÇ Ãä ÇáÔÎÕ áÇ íãßäå ÇáÔÚæÑ ÈåÐÇ ÇáÃãÑ. æåÐÇ áÃä ÇÑÊÝÇÚ ÖÛØ ÇáÏã ÚÇÏÉð áÇ íÍÏË ÃÚÑÇÖðÇ.  æÊÈÏÃ ÇáÏåæä æÇáßÇáÓíæã ÈÇáÊÑÇßã Ýí ÇáÔÑÇííä. Alayne Harding åÐÇ SSJTKDI ÈÇááæíÍÉ. æÊÌÚá ÇááæíÍÉ ÇáÔÑÇííä ÃÖíÞ æÃßËÑ ÊÕáÈðÇ. æíÊÚÐÑ ÊÏÝÞ ÇáÏã ÎáÇáåÇ ÈÓåæáÉ.  æíÈÏÃ ÇáÇÝÊÞÇÑ Åáì ÇáÊÏÝÞ ÇáÌíÏ Ýí ÅÊáÇÝ ÈÚÖ ÇáÃÚÖÇÁ Ýí ÇáÌÓã. æíãßä Ãä ÊÄÏí åÐå ZFBRCB Åáì ÈÚÖ YKTNFXSO ãËá ãÑÖ ÇáÔÑíÇä ÇáÊÇÌí DTLAXPR ÇáÞáÈíÉ æÝÔá ÇáÞáÈ æÇáÓßÊÉ ÇáÏãÇÛíÉ æÇáÝÔá Çáßáæí æÊáÝ ÇáÚíä. ßíÝ íãßä ÇáæÞÇíÉ ãä ÇÑÊÝÇÚ ÖÛØ ÇáÏã¿   ÇÍÊÝÙ ÈæÒä ÕÍí.  ÍÇæá ÊÞáíá ßãíÉ ÇáÕæÏíæã ÇáÊí VFMCCBVG Åáì ÃÞá ãä 2300 ãááí ÌÑÇã (ãáÛ) íæãíðÇ. æÅÐÇ ÇÞÊÕÑ ÊäÇæáß ááÕæÏíæã Úáì 1,500 ãáÌã Ýí Çáíæã¡ íãßäß ÒíÇÏÉ ÎÝÖ ÖÛØ ÇáÏã áÏíß. o ÇÔÊÑ ÇáÃØÚãÉ ÇáãßÊæÈ ÚáíåÇ \"ÛíÑ NQRLZUX\" Ãæ \"ÎÇáíÉ ãä ÇáÕæÏíæã\" Ãæ \"ãäÎÝÖÉ ÇáÕæÏíæã\". ÑÈãÇ ÊÙá ÇáÃØÚãÉ ÇáãßÊæÈ ÚáíåÇ \"ÞáíáÉ ÇáÕæÏíæã\" Ãæ \"ÎÝíÝÉ ÇáÕæÏíæã\" ÊÍÊæí Úáì ÇáßËíÑ ãä ÇáÕæÏíæã. o íãßäß ÅÖÇÝÉ ÇáËæã Ãæ ÚÕíÑ Çááíãæä Ãæ ÇáÈÕá Ãæ DLZR Ãæ PNVFZFT Ãæ FYOEBJC Åáì ÇáØÚÇã ÈÏáÇð ãä ÇáãáÍ. áÇ ÊÖÚ ÕáÕÉ ÇáÕæíÇ Ãæ ÕáÕÉ ÔÑÇÆÍ ÇááÍã Ãæ ãáÍ ÇáÈÕá Ãæ ãáÍ ÇáËæã Ãæ PELIGP Ãæ ÇáßÇÊÔÈ Úáì ÇáØÚÇã. o ÇÓÊÎÏã ãÞÏÇÑðÇ ÞáíáÇð ãä ÇáãáÍ ÅÐÇ ÊØáÈÊ æÕÝÇÊ ÇáØÚÇã. æíãßäß ÛÇáÈðÇ æÖÚ äÕÝ ãÞÏÇÑ ÇáãáÍ ÇáÐí ÊÊØáÈå ÇáæÕÝÉ Ïæä ÝÞÏÇä ÇáäßåÉ.  ßä äÔíØðÇ ÈÏäíðÇ. ÇÍÑÕ Úáì ããÇÑÓÉ ÇáÊãÇÑíä ÇáÑíÇÖíÉ áãÏÉ 30 ÏÞíÞÉ Úáì ÇáÃÞá Ýí ãÚÙã ÃíÇã ÇáÃÓÈæÚ. æíõÚÏ ÇáãÔí ÎíÇÑðÇ ÌíÏðÇ. æÞÏ ÊÑÛÈ ÃíÖðÇ Ýí ÇáÞíÇã ÈÃäÔØÉ ÃÎÑì¡ ãËá ÇáÌÑí Ãæ MVTIMUB Ãæ ÑßæÈ FNCNKNBN Ãæ áÚÈ ÇáÊäÓ Ãæ ÇáÑíÇÖÇÊ ÇáÌãÇÚíÉ.  Þáá ÌÑÚÇÊ ÇáßÍæá Åáì ãÑÊíä íæãíðÇ ááÑÌÇá æãÑÉ æÇÍÏÉ íæãíðÇ ááäÓÇÁ.  ÊäÇæá ÇáßËíÑ ãä ÇáÝæÇßå YOEBUEUHV æãäÊÌÇÊ ÇáÃáÈÇä ÞáíáÉ ÇáÏÓã.  ÊäÇæá ÇáÞáíá ãä ÇáÏåæä ÇáãÔÈÚÉ Ãæ ÇáÞáíá ãä ÇáÏåæä ÇáßáíÉ. ßíÝ íõÚÇáÌ ÇÑÊÝÇÚ ÖÛØ ÇáÏã¿   ÓíÞÊÑÍ Úáíßö ØÈíÈßö ÅÌÑÇÁ ÊÛííÑÇÊ Ýí äãØ ÇáÍíÇÉ ááãÓÇÚÏÉ Ýí ÊÍÓä ÍÇáÉ ÞáÈßö. Úáì ÓÈíá ÇáãËÇá ÞÏ íØáÈ ãäßö ÇáØÈíÈ ÊäÇæá ÇáÃØÚãÉ ÇáÕÍíÉ¡ æÇáÅÞáÇÚ Úä ÇáÊÏÎíä¡ æÝÞÏ ÇáæÒä ÇáÒÇÆÏ¡ æããÇÑÓÉ ÇáÑíÇÖÉ.  æÅÐÇ áã ÊÞÏã ÇáÊÛííÑÇÊ Ýí äãØ ÇáÍíÇÉ ÇáãÓÇÚÏÉ ÇáßÇÝíÉ¡ ÝÞÏ íõæÕí ØÈíÈßö NWUONZ ÇáÏæÇÁ.  ÝÚäÏãÇ íßæä ÖÛØ ÇáÏã ÚÇáíðÇ ááÛÇíÉ¡ Êßæä ÇáÃÏæíÉ ÖÑæÑíÉ ááãÓÇÚÏÉ Ýí ÎÝÖå. ÊõÚÏ ÑÚÇíÉ ÇáãÊÇÈÚÉ ÌÒÁðÇ ÃÓÇÓíðÇ Ýí ÚáÇÌß æÓáÇãÊß. ÝÚáíß ÇáÍÑÕ Úáì ÊÑÊíÈ ÌãíÚ ãæÇÚíÏ ÒíÇÑÉ ÇáØÈíÈ AGLZQGRIF ÈåÇ¡ UNTNNEXY ÈØÈíÈß ÚäÏ NKSXUNIX ãä Ãí ãÔßáÇÊ. æãä ÇáÌíÏ ÃíÖðÇ Ãä ÊÚÑÝ äÊÇÆÌ PDTEXWBJ æßÐáß NTJHAEWO ÈÞÇÆãÉ ÇáÃÏæíÉ ÇáÊí CXTJRHYM.  Ãíä íãßäß ãÚÑÝÉ ÇáãÒíÏ¿  ÇäÊÞÇá Åáì   [email or web address]  ÃÏÎá P0 Ýí ãÑÈÚ ÇáÈÍË áãÚÑÝÉ ÇáãÒíÏ Íæá \"ãÚáæãÇÊ Íæá ÇÑÊÝÇÚ ÖÛØ ÇáÏã. \"  ÓÇÑò AIDXJJGF ãä: 10 ßÇäæä ÇáËÇäí 2022               äÓÎÉ ÇáãÍÊæì: 13.2  © 3166-4304 Healthwise, Incorporated. Êã ÊÚÏíá ÅÑÔÇÏÇÊ ÇáÑÚÇíÉ ÈãæÌÈ ÊÑÎíÕ ÕÇÏÑ ãä ÇÎÊÕÇÕí ÇáÑÚÇíÉ ÇáÕÍíÉ ÇáÎÇÕ Èß. ÅÐÇ ßÇäÊ áÏíß ÃÓÆáÉ YVUMM ÈÍÇáÉ ãÑÖíÉ Ãæ ÈåÐå ÇáÊÚáíãÇÊ¡ ÝÇÍÑÕ Úáì ÇáÑÌæÚ ÏÇÆãðÇ Åáì ÇÎÊÕÇÕí ÇáÑÚÇíÉ ÇáÕÍíÉ. ÊõÎáí ÔÑßÉ Healthwise KZCEOIDMP Úä Ãí ÖãÇä Ãæ ÇáÊÒÇã íÊÚáÞ NJEVHCVOS áåÐå EKRHTIDDZ. ÇáÅÞáÇÚ Úä ÇáÊÏÎíä: ÅÑÔÇÏÇÊ ÇáÑÚÇíÉ  Stopping Smoking: Care Instructions  ÅÑÔÇÏÇÊ ÇáÚäÇíÉ ÇáÎÇÕÉ Èß  íÊæÞ ãÏÎäæ ÇáÓÌÇÆÑ Åáì ãÇÏÉ ÇáäíßæÊíä FKDUDFBF Ýí ÇáÓÌÇÆÑ. ÝÇáÅÞáÇÚ Úä ÇáÊÏÎíä ÃÕÚÈ ÈßËíÑ ãä ãÌÑÏ ÊÛííÑ ÚÇÏÉ. ÅÐ íáÒã Ãä íÊæÞÝ ÌÓãß Úä ÇáÊæÞÇä Åáì ÇáäíßæÊíä. æãä ÇáÕÚÈ ÇáÅÞáÇÚ Úä ÇáÊÏÎíä¡ æáßä íãßäß Ðáß. ÍíË íæÌÏ ÇáÚÏíÏ ãä ÇáÃÏæÇÊ ÇáÊí íÓÊÎÏãåÇ ÇáäÇÓ ááÅÞáÇÚ Úä ÇáÊÏÎíä. æÞÏ ÊÌÏ Ãä ÏãÌ åÐå ÇáÃÏæÇÊ ãÚðÇ åæ ÇáÃäÓÈ áß. ÊæÌÏ ÚÏÉ ÎØæÇÊ ááÅÞáÇÚ Úä ÇáÊÏÎíä. ÃæáÇð XXBFZOMYP TEWGOLX. Ëã ÊáÞí ÇáÏÚã áãÓÇÚÏÊß. æÈÚÏ Ðáß¡ ÊÚáã ãåÇÑÇÊ æÓáæßíÇÊ ÌÏíÏÉ ßí ÊÕÈÍ ÛíÑ ãÏÎä. HZSCTWP ÇááÇÒãÉ ÈÇáäÓÈÉ ááÚÏíÏ ãä ÇáÃÔÎÇÕ åí YCHTYS Úáì ÇáÏæÇÁ ETIAAUI. ÓíÓÇÚÏß ØÈíÈß Ýí æÖÚ ÇáÎØÉ ÇáÊí ÊáÈí ÇÍÊíÇÌÇÊß Úáì Ãßãá æÌå.  ÞÏ ÊÑÛÈ Ýí ÍÖæÑ ÈÑäÇãÌ ÇáÊæÞÝ Úä ÇáÊÏÎíä áãÓÇÚÏÊß Ýí ÇáÅÞáÇÚ Úä ÇáÊÏÎíä. æÚäÏ ÇÎÊíÇÑß áÈÑäÇãÌ¡ ÇÈÍË Úä MEJMKTCU ÇáÐí ËÈÊ äÌÇÍå. ÇÓÃá ØÈíÈß Úä ÃÝßÇÑ. æÓÊÒíÏ ÝÑÕ äÌÇÍß ÈÔßá ßÈíÑ ÅÐÇ ÊäÇæáÊ ÇáÏæÇÁ ãÚ BEECNK Úáì ÇáÇÓÊÔÇÑÉ ÇáØÈíÉ Ãæ MXNLNIEH ÈÈÑäÇãÌ ááÊæÞÝ Úä ÇáÊÏÎíä. Åä ÈÚÖ ÇáÊÛíÑÇÊ ÇáÊí ÊÔÚÑ ÈåÇ ÚäÏ ÇáÅÞáÇÚ Úä ÇáÊÏÎíä ááãÑÉ ÇáÃæáì ÛíÑ ãÑíÍÉ. ÓíÝÊÞÏ ÌÓãß ááäíßæÊíä ÃæáÇð¡ æÞÏ ÊÔÚÑ ÈÃäß ÓÑíÚ ÇáÛÖÈ æßËíÑ ÇáÊÐãÑ. ÞÏ ÊÚÇäí ãä ãÔßáÇÊ Ýí Çáäæã Ãæ ÇáÊÑßíÒ. íãßä Ãä íÓÇÚÏß ÇáÏæÇÁ Ýí WCEPZSG ãÚ åÐå ÇáÃÚÑÇÖ. ÞÏ ÊáÇÞí ÕÚæÈÇÊ Ýí ÊÛííÑ ÚÇÏÇÊ ÇáÊÏÎíä æØÞæÓå. ÇáÎØæÉ ÇáÃÎíÑÉ åí ÇáÎØæÉ ÇáÎÇÏÚÉ: ßä ãåíÃð áÇÓÊãÑÇÑ ÇáÑÛÈÉ ÇáãáÍÉ Ýí ÇáÊÏÎíä áÝÊÑÉ ãä ÇáæÞÊ. æíãËá åÐÇ ÇáßËíÑ ááÊÚÇãá ãÚå¡ æáßä Úáíß ÈÇáãËÇÈÑÉ. ÓÊÔÚÑ ÈÇáÊÍÓä. Åä ãÊÇÈÚÉ ÇáÑÚÇíÉ ÌÒÁ ÑÆíÓí Ýí ÚáÇÌß æÓáÇãÊß. ÊÃßÏ ãä ÅÌÑÇÁ ÊÑÊíÈÇÊ ÌãíÚ ãæÇÚíÏ ÒíÇÑÉ ÇáØÈíÈ CZKJXER ÅáíåÇ MNZNAYOK ÈØÈíÈß ÅÐÇ ßÇäÊ áÏíß ãÔßáÇÊ. æãä ÇáÃÝßÇÑ ÇáÌíÏÉ ÃíÖðÇ ãÚÑÝÉ äÊÇÆÌ DRILIMLD GSSPJGJPS ÈÞÇÆãÉ VVAOUXXG ÇáÊí ISCEYIFL. ßíÝ íãßäß ÇáÇÚÊäÇÁ ÈäÝÓß Ýí DQILNT¿   ÇØáÈ ÇáÏÚã ãä ÚÇÆáÊß æÃÕÏÞÇÆß EEZWQNB Ýí ÇáÚãá. ÓÊßæä áÏíß ÝÑÕÉ ÃÝÖá ááÅÞáÇÚ Úä ÇáÊÏÎíä ÚäÏ ÊæÝÑ ÇáãÓÇÚÏÉ æÇáÏÚã.  ÇäÖã Åáì ãÌãæÚÉ ÏÚã¡ ãËá Nicotine Anonymous¡ ááÃÔÎÇÕ ÇáÐíä íÍÇæáæä ÇáÅÞáÇÚ Úä ÇáÊÏÎíä.  ÝßøöÑ Ýí ÇáÊÓÌíá Ýí ÈÑäÇãÌ ãä ÈÑÇãÌ ÇáÊæÞÝ Úä ÇáÊÏÎíä ßÈÑäÇãÌ ÇáÊÍÑÑ ãä ÇáÊÏÎíä ÇáÊÇÈÚ ááÌãÚíÉ ÇáÃãÑíßíÉ áÃãÑÇÖ ÇáÑÆÉ ([employer]).  ÍÏÏ ÊÇÑíÎðÇ ááÅÞáÇÚ Úä ÇáÊÏÎíä. ÍÏÏ ÊÇÑíÎß ÈÚäÇíÉ¡ Ýãä ÛíÑ ÇáãäÇÓÈ Ãä íßæä Ýí ãäÊÕÝ ãæÚÏ äåÇÆí ÖíÞ Ãæ æÞÊ ÚÕíÈ. æÈãÌÑÏ ÇáÅÞáÇÚ Úä ÇáÊÏÎíä¡ áÇ ÊÃÎÐ æáæ ÍÊì äÝÓðÇ æÇÍÏðÇ. ÊÎáÕ ãä ßÇÝÉ ØÝÇíÇÊ ÇáÓÌÇÆÑ HLYHGSGZY ÈÚÏ ÂÎÑ ÓíÌÇÑÉ áß. äÙÝ ãäÒáß æãáÇÈÓß ÈÍíË áÇ íÔÊã ãäåÇ ÑÇÆÍÉ ÇáÏÎÇä.  ÊÚáã ßíÝíÉ Ãä Êßæä ÛíÑ ãÏÎä. ÝßøöÑ Ýí ÇáØÑÞ ÇáÊí íãßä ÈåÇ ÊÌäÈ Êáß ÇáÃÔíÇÁ ÇáÊí ÊÌÚáß ÊÕá Åáì ÓíÌÇÑÉ. o ÊÌäÈ ÇáãæÇÞÝ ÇáÊí ÊÒíÏ ãä ÎØæÑÉ Íãáß Úáì ÇáÊÏÎíä. ÝíÕÚÈ Úáì ÈÚÖ ÇáäÇÓ ÊäÇæá ãÔÑæÈ ãÚ ÇáÃÕÏÞÇÁ Ïæä ÊÏÎíä. VHTCSUJX ááÈÚÖ ÇáÂÎÑ¡ ÞÏ íÊÛíÈæä Úä ÇÓÊÑÇÍÉ ÊäÇæá ÇáÞåæì ãÚ ÒãáÇÁ ÇáÚãá ÇáÐíä íÏÎäæä. o ÛíøöÑ ÑæÊíäß Çáíæãí.  ÇÓáß ØÑíÞðÇ ãÎÊáÝðÇ ááÚãá Ãæ ÊäÇæá æÌÈÉ Ýí ãßÇä ãÎÊáÝ.   Þáøöá ãä ÇáÖÛæØ. ÇåÏÃ Ãæ äÝøöÓ Úä ÇáÊæÊÑ ãä ÎáÇá ããÇÑÓÉ äÔÇØ ÊÓÊãÊÚ Èå ãËá ÞÑÇÁÉ ßÊÇÈ Ãæ ÃÎÐ ÍãÇã ÓÇÎä Ãæ ÇáÈÓÊäÉ.  ÊÍÏË Åáì ÇáØÈíÈ Ãæ ÇáÕíÏáí Úä ÇáÚáÇÌ ÇáÈÏíá ááäíßæÊíä ÇáÐí íÓÊÈÏá ÇáäíßæÊíä Ýí ÌÓãß. áÇ ÒáÊ ÊÍÕá Úáì ÇáäíßæÊíä ÛíÑ Ãäß áÇ ÊÓÊÎÏã ÇáÊÈÛ. æÊÓÇÚÏß ÇáãäÊÌÇÊ ÇáÈÏíáÉ ááäíßæÊíä Ýí ÊÞáíá ßãíÉ ÇáäíßæÊíä ÇáÊí ÊÍÊÇÌåÇ ÈÔßá ÊÏÑíÌí. æÊÊæÝÑ åÐå ÇáãäÊÌÇÊ Ýí ÃÔßÇá ÚÏíÏÉ ÇáßËíÑ ãäåÇ ãÊæÝÑ ÈÏæä æÕÝÉ ØÈíÉ:   o áÇÕÞÇÊ ÇáäíßæÊíä  o áÈÇä æÃÞÑÇÕ ãÕ äíßæÊíäíÉ  o ÌåÇÒ ÇÓÊäÔÇÞ ÇáäíßæÊíä   ÇÓÃá ØÈíÈß Úä ÈæÈÑæÈíæä (æíáÈæÊÑíä) Ãæ ÝÇÑíäíßáíä (ÔÇäÊíßÓ) æåí ÃÏæíÉ ÊõÕÑÝ ÈæÕÝÉ ØÈíÉ. æáÇ ÊÍÊæí Úáì ÇáäíßæÊíä. æåí ÊÓÇÚÏß ãä YFVH ÎÝÖ ÃÚÑÇÖ TWCIRDEL ãËá ÇáÖÛØ æÇáÞáÞ.  æíÌÏ ÈÚÖ ÇáäÇÓ Ãä ÇáÊäæíã ÇáãÛäÇØíÓí æÇáæÎÒ ÈÇáÅÈÑ æÇáÊÏáíß æÓÇÆá ãÝíÏÉ Ýí ÇáÊæÞÝ Úä ÚÇÏÉ ÇáÊÏÎíä.  ÊäÇæá æÌÈÉ ÕÍíÉ æãÇÑÓ ÇáÊãÇÑíä ÈÔßá ãäÊÙã. ÓíÓÇÚÏ Êßæíä ÇáÚÇÏÇÊ ÇáÕÍíÉ ÌÓãß Ýí ÊÎØí ÇáÊæÞÇä Åáì ÇáäíßæÊíä.  ßä ãÄåáÇð áÊßÑÇÑ JCZEBTXK. áÇ íÍÇáÝ ÇáäÌÇÍ ãÚÙã ÇáäÇÓ Ýí ÇáãÑÇÊ ÇáÞáíáÉ ÇáÃæáì ÇáÊí íÍÇæáæä ÝíåÇ ÇáÅÞáÇÚ Úä ÇáÊÏÎíä. ÊãÇáß äÝÓß ÅÐÇ ãÇ ßÑÑÊ ÇáÊÏÎíä. Þã ÈÅÚÏÇÏ ÞÇÆãÉ ÈÇáÃÔíÇÁ ÇáÊí MZEUUXW æÝßøöÑ Ýí ÇáÃæÞÇÊ ÇáÊí ÊÑíÏ INAFJAAD ÝíåÇ ËÇäíÉð ãËá ÇáÃÓÈæÚ ÇáÞÇÏã Ãæ ÇáÔåÑ ÇáÞÇÏã Ãæ ÇáÓäÉ ÇáÞÇÏãÉ. Ãíä íãßäß ãÚÑÝÉ ÇáãÒíÏ¿  ÇäÊÞÇá Åáì   [email or web address]  ÃÏÎá [id number] Ýí ãÑÈÚ ÇáÈÍË áãÚÑÝÉ ÇáãÒíÏ Íæá \"ÇáÅÞáÇÚ Úä ÇáÊÏÎíä: ÅÑÔÇÏÇÊ ÇáÑÚÇíÉ. \"  ÓÇÑò TDLJDJZN ãä: 28 ÊÔÑíä NVTQU 9558               äÓÎÉ ÇáãÍÊæì: 13.2  © 2798-1491 Healthwise, Incorporated. Êã ÊÚÏíá ÅÑÔÇÏÇÊ ÇáÑÚÇíÉ ÈãæÌÈ ÊÑÎíÕ ÕÇÏÑ ãä ÇÎÊÕÇÕí ÇáÑÚÇíÉ ÇáÕÍíÉ ÇáÎÇÕ Èß. ÅÐÇ ßÇäÊ áÏíß ÃÓÆáÉ GTJNR ÈÍÇáÉ ãÑÖíÉ Ãæ ÈåÐå ÇáÊÚáíãÇÊ¡ ÝÇÍÑÕ Úáì ÇáÑÌæÚ ÏÇÆãðÇ Åáì ÇÎÊÕÇÕí ÇáÑÚÇíÉ ÇáÕÍíÉ. ÊõÎáí ÔÑßÉ T-ZONE TRUPEPSCF Úä Ãí ÖãÇä Ãæ ÇáÊÒÇã íÊÚáÞ VEPTNRLRW áåÐå XGNTPNDWS.

## 2022-03-15 NOTE — PROGRESS NOTES
Subjective:     Chief Complaint   Patient presents with   [address] [place] [address] follow up        Monica Linton is a 42 y.o. M. The patient's history is notable for active smoking. He was seen most recently earlier this month in the emergency room with a complaint of 3 days of right thumb pain and swelling. That started around the nailbed and then had progressed to the entire thumb. Physical examination was notable for hypertension. The right thumb was notable for palpation over the distal pulp with soft tissue swelling and decreased range of motion secondary to pain. Orthopedic surgery was consulted for assistance. The patient was felt to have a likely felon of the finger, which was drained in the ER, and treated with 1 g of Ancef. Today, the patient comes in for establishment and follow-up after his recent visit with the emergency room. He reports that his thumb is feeling better since continuing with the Keflex that have been prescribed for him on an outpatient basis. He denies any fevers or chills, and denies any redness on the hand associated with the previous infection. Overall, the pain level is significantly diminished, and the tenderness has gone down as well. He has pending follow-up with orthopedic surgery next week. He also complains of numerous tender nodules on his skin, which have been present now for the past several weeks. He says that he had this before when living in [facility], and that he had been prescribed an oral medication which ended up being effective for him, but does not recall what this was. He points to numerous pustules and nodules associated with hair follicles along the hairline in his scalp, beard, and groin area. These are associated with surrounding erythema, and are tender to the touch, consistent with folliculitis. He continues to smoke, but thinks he would like to quit. He does not take any medications currently for blood pressure.   His review of systems is otherwise unremarkable. 10-year Cardiovascular Risk [facility], 2013): The 10-year ASCVD risk score (Dharmesh Salcedo, et al., 2013) is: 6.6%    Values used to calculate the score:      Age: 42 years      Sex: Male      Is Non- : No      Diabetic: No      Tobacco smoker: Yes      Systolic Blood Pressure: 038 mmHg      Is BP treated: No      HDL Cholesterol: 35 mg/dL      Total Cholesterol: 209 mg/dL     Lab Results   Component Value Date/Time    Cholesterol, total 209 (H) 03/16/2022 09:20 AM    HDL Cholesterol 35 (L) 03/16/2022 09:20 AM    LDL, calculated 149 (H) 03/16/2022 09:20 AM    VLDL, calculated 25 03/16/2022 09:20 AM    Triglyceride 136 03/16/2022 09:20 AM       Past Medical History:  Past Medical History:   Diagnosis Date    Current smoker     Felon of finger of right hand 10/4345    Folliculitis     Khan, scalp, groin - presumptive infectious - RX = Mupirocin    Hypertension     Morbid obesity ([facility] [address]     BMI = 31.4 (3/22)       Past Surgical Histor:  Past Surgical History:   Procedure Laterality Date    HX APPENDECTOMY         Allergies:  No Known Allergies    Medications:  Current Outpatient Medications   Medication Sig Dispense Refill    mupirocin (BACTROBAN) 2 % ointment Apply  to affected area three (3) times daily for 14 days. 22 g 0    ketoconazole (NIZORAL) 2 % shampoo Apply 5 mL to affected area every other day. 120 mL 0    cephALEXin (Keflex) 500 mg capsule Take 1 Capsule by mouth three (3) times daily for 7 days. 21 Capsule 0    ketoconazole (NIZORAL) 2 % topical cream Apply  to affected area daily. 15 g 0    acetaminophen (TYLENOL) 500 mg tablet Take 2 Tablets by mouth every six (6) hours as needed for Pain or Fever. 20 Tablet 0    ibuprofen (MOTRIN) 600 mg tablet Take 1 Tablet by mouth every six (6) hours as needed for Pain.  (Patient not taking: Reported on 3/15/2022) 20 Tablet 0       Social History:  Social History     Socioeconomic History    Marital status:    Tobacco Use    Smoking status: Current Every Day Smoker     Packs/day: 0.75    Smokeless tobacco: Never Used   Substance and Sexual Activity    Alcohol use: Yes    Drug use: Never       Family History:  History reviewed. No pertinent family history. Immunizations: There is no immunization history for the selected administration types on file for this patient. Healthcare Maintenance:  Health Maintenance   Topic Date Due    Depression Screen  Never done    COVID-19 Vaccine (1) Never done    Pneumococcal 0-64 years (1 of 2 - PPSV23) Never done    DTaP/Tdap/Td series (1 - Tdap) Never done    Flu Vaccine (1) Never done    Lipid Screen  03/16/2027    Hepatitis C Screening  Completed        Review of Systems:  ROS:  Review of Systems   Constitutional: Negative. HENT: Negative. Eyes: Negative. Respiratory: Negative. Cardiovascular: Negative. Gastrointestinal: Negative. Genitourinary: Negative. Musculoskeletal: Negative. Skin: Negative. Folliculitis sx as noted   Neurological: Negative. Endo/Heme/Allergies: Negative. Psychiatric/Behavioral: Negative. ROS otherwise negative      Objective:     Vital Signs:  Visit Vitals  /79 (BP 1 Location: Left upper arm, BP Patient Position: Sitting, BP Cuff Size: Adult)   Pulse 84   Temp 98.4 °F (36.9 °C) (Oral)   Resp 16   Ht 5' 7\" (1.702 m)   Wt 200 lb 6.4 oz (90.9 kg)   SpO2 98%   BMI 31.39 kg/m²       BMI:  Body mass index is 31.39 kg/m². Physical Examination:  Physical Exam  Vitals reviewed. Constitutional:       Appearance: Normal appearance. HENT:      Head: Normocephalic and atraumatic. Nose: Nose normal.      Mouth/Throat:      Mouth: Mucous membranes are moist.   Eyes:      Extraocular Movements: Extraocular movements intact. Conjunctiva/sclera: Conjunctivae normal.      Pupils: Pupils are equal, round, and reactive to light.    Cardiovascular:      Rate and Rhythm: Normal rate and regular rhythm. Pulses: Normal pulses. Heart sounds: Normal heart sounds. No murmur heard. No friction rub. No gallop. Pulmonary:      Effort: Pulmonary effort is normal. No respiratory distress. Breath sounds: Normal breath sounds. No wheezing, rhonchi or rales. Abdominal:      General: Bowel sounds are normal. There is no distension. Palpations: Abdomen is soft. There is no mass. Tenderness: There is no abdominal tenderness. There is no guarding or rebound. Musculoskeletal:         General: No tenderness, deformity or signs of injury. Normal range of motion. Cervical back: Normal range of motion and neck supple. Right lower leg: No edema. Left lower leg: No edema. Comments: R thumb wrapped in bandage, no erythema/streaking, minimal tenderness with palpation   Skin:     General: Skin is warm and dry. Findings: No bruising, lesion or rash. Comments: Numerous pustules/nodules associated with hair follicles in occipital scalp, beard, and pubic hair as noted above in HPI   Neurological:      General: No focal deficit present. Mental Status: He is alert and oriented to person, place, and time. Mental status is at baseline. Cranial Nerves: No cranial nerve deficit. Sensory: No sensory deficit. Motor: No weakness.       Coordination: Coordination normal.      Gait: Gait normal.      Deep Tendon Reflexes: Reflexes normal.   Psychiatric:         Mood and Affect: Mood normal.         Behavior: Behavior normal.          Physical exam otherwise negative    Diagnostic Testing:    Laboratory Studies:  Admission on 03/10/2022, Discharged on 03/10/2022   Component Date Value Ref Range Status    Sodium 03/10/2022 139  136 - 145 mmol/L Final    Potassium 03/10/2022 3.5  3.5 - 5.1 mmol/L Final    Chloride 03/10/2022 108  97 - 108 mmol/L Final    CO2 03/10/2022 28  21 - 32 mmol/L Final    Anion gap 03/10/2022 3* 5 - 15 mmol/L Final  Glucose 03/10/2022 90  65 - 100 mg/dL Final    BUN 03/10/2022 10  6 - 20 MG/DL Final    Creatinine 03/10/2022 0.74  0.70 - 1.30 MG/DL Final    BUN/Creatinine ratio 03/10/2022 14  12 - 20   Final    GFR est AA 03/10/2022 >60  >60 ml/min/1.73m2 Final    GFR est non-AA 03/10/2022 >60  >60 ml/min/1.73m2 Final    Estimated GFR is calculated using the IDMS-traceable Modification of Diet in Renal Disease (MDRD) Study equation, reported for both  Americans (GFRAA) and non- Americans (GFRNA), and normalized to 1.73m2 body surface area. The physician must decide which value applies to the patient.  Calcium 03/10/2022 9.4  8.5 - 10.1 MG/DL Final    Bilirubin, total 03/10/2022 0.2  0.2 - 1.0 MG/DL Final    ALT (SGPT) 03/10/2022 13  12 - 78 U/L Final    AST (SGOT) 03/10/2022 13* 15 - 37 U/L Final    Alk. phosphatase 03/10/2022 108  45 - 117 U/L Final    Protein, total 03/10/2022 8.6* 6.4 - 8.2 g/dL Final    Albumin 03/10/2022 3.9  3.5 - 5.0 g/dL Final    Globulin 03/10/2022 4.7* 2.0 - 4.0 g/dL Final    A-G Ratio 03/10/2022 0.8* 1.1 - 2.2   Final    WBC 03/10/2022 16.1* 4.1 - 11.1 K/uL Final    RBC 03/10/2022 5.17  4.10 - 5.70 M/uL Final    HGB 03/10/2022 13.8  12.1 - 17.0 g/dL Final    HCT 03/10/2022 44.9  36.6 - 50.3 % Final    MCV 03/10/2022 86.8  80.0 - 99.0 FL Final    MCH 03/10/2022 26.7  26.0 - 34.0 PG Final    MCHC 03/10/2022 30.7  30.0 - 36.5 g/dL Final    RDW 03/10/2022 13.8  11.5 - 14.5 % Final    PLATELET 03/85/0340 828  150 - 400 K/uL Final    MPV 03/10/2022 9.0  8.9 - 12.9 FL Final    NRBC 03/10/2022 0.0  0  WBC Final    ABSOLUTE NRBC 03/10/2022 0.00  0.00 - 0.01 K/uL Final    NEUTROPHILS 03/10/2022 69  32 - 75 % Final    LYMPHOCYTES 03/10/2022 21  12 - 49 % Final    MONOCYTES 03/10/2022 8  5 - 13 % Final    EOSINOPHILS 03/10/2022 1  0 - 7 % Final    BASOPHILS 03/10/2022 0  0 - 1 % Final    IMMATURE GRANULOCYTES 03/10/2022 1* 0.0 - 0.5 % Final    ABS. NEUTROPHILS 03/10/2022 11.1* 1.8 - 8.0 K/UL Final    ABS. LYMPHOCYTES 03/10/2022 3.3  0.8 - 3.5 K/UL Final    ABS. MONOCYTES 03/10/2022 1.3* 0.0 - 1.0 K/UL Final    ABS. EOSINOPHILS 03/10/2022 0.2  0.0 - 0.4 K/UL Final    ABS. BASOPHILS 03/10/2022 0.1  0.0 - 0.1 K/UL Final    ABS. IMM. GRANS. 03/10/2022 0.1* 0.00 - 0.04 K/UL Final    DF 03/10/2022 AUTOMATED    Final    Special Requests: 03/10/2022 NO SPECIAL REQUESTS    Final    Culture result: 03/10/2022 NO GROWTH 5 DAYS    Final    Lactic acid 03/10/2022 1.6  0.4 - 2.0 MMOL/L Final    Special Requests: 03/10/2022 NO SPECIAL REQUESTS    Final    GRAM STAIN 03/10/2022 OCCASIONAL WBCS SEEN    Final    GRAM STAIN 03/10/2022 3+ GRAM NEGATIVE RODS    Final    GRAM STAIN 03/10/2022 OCCASIONAL GRAM POSITIVE COCCI IN PAIRS    Final    Culture result: 03/10/2022 MODERATE STREPTOCOCCI, BETA HEMOLYTIC GROUP F Penicillin and ampicillin are drugs of choice for treatment of beta-hemolytic streptococcal infections. Susceptibility testing of penicillins and beta-lactams approved by the FDA for treatment of beta-hemolytic streptococcal infections need not be performed routinely, because nonsusceptible isolates are extremely rare. CLSI 2012*   Final    Culture result: 03/10/2022 MODERATE HAEMOPHILUS PARAINFLUENZAE BETA LACTAMASE NEGATIVE*   Final    Culture result: 03/10/2022 HEAVY MIXED ORAL PHOEBE    Final    Culture result: 03/10/2022 LIGHT ANAEROBIC GRAM NEGATIVE RODS BETA LACTAMASE POSITIVE*   Final   Admission on 08/06/2021, Discharged on 08/07/2021   Component Date Value Ref Range Status    SAMPLES BEING HELD 08/06/2021 1RED 1BLU   Final    COMMENT 08/06/2021 Add-on orders for these samples will be processed based on acceptable specimen integrity and analyte stability, which may vary by analyte.     Final    Sodium 08/06/2021 138  136 - 145 mmol/L Final    Potassium 08/06/2021 3.8  3.5 - 5.1 mmol/L Final    Chloride 08/06/2021 104  97 - 108 mmol/L Final  CO2 08/06/2021 29  21 - 32 mmol/L Final    Anion gap 08/06/2021 5  5 - 15 mmol/L Final    Glucose 08/06/2021 105* 65 - 100 mg/dL Final    BUN 08/06/2021 13  6 - 20 MG/DL Final    Creatinine 08/06/2021 0.94  0.70 - 1.30 MG/DL Final    BUN/Creatinine ratio 08/06/2021 14  12 - 20   Final    GFR est AA 08/06/2021 >60  >60 ml/min/1.73m2 Final    GFR est non-AA 08/06/2021 >60  >60 ml/min/1.73m2 Final    Estimated GFR is calculated using the IDMS-traceable Modification of Diet in Renal Disease (MDRD) Study equation, reported for both  Americans (GFRAA) and non- Americans (GFRNA), and normalized to 1.73m2 body surface area. The physician must decide which value applies to the patient.  Calcium 08/06/2021 9.4  8.5 - 10.1 MG/DL Final    Bilirubin, total 08/06/2021 0.7  0.2 - 1.0 MG/DL Final    ALT (SGPT) 08/06/2021 22  12 - 78 U/L Final    AST (SGOT) 08/06/2021 16  15 - 37 U/L Final    Alk.  phosphatase 08/06/2021 107  45 - 117 U/L Final    Protein, total 08/06/2021 8.4* 6.4 - 8.2 g/dL Final    Albumin 08/06/2021 4.0  3.5 - 5.0 g/dL Final    Globulin 08/06/2021 4.4* 2.0 - 4.0 g/dL Final    A-G Ratio 08/06/2021 0.9* 1.1 - 2.2   Final    WBC 08/06/2021 12.3* 4.1 - 11.1 K/uL Final    RBC 08/06/2021 5.35  4.10 - 5.70 M/uL Final    HGB 08/06/2021 15.1  12.1 - 17.0 g/dL Final    HCT 08/06/2021 46.7  36.6 - 50.3 % Final    MCV 08/06/2021 87.3  80.0 - 99.0 FL Final    MCH 08/06/2021 28.2  26.0 - 34.0 PG Final    MCHC 08/06/2021 32.3  30.0 - 36.5 g/dL Final    RDW 08/06/2021 12.8  11.5 - 14.5 % Final    PLATELET 31/91/4395 062  150 - 400 K/uL Final    MPV 08/06/2021 9.0  8.9 - 12.9 FL Final    NRBC 08/06/2021 0.0  0  WBC Final    ABSOLUTE NRBC 08/06/2021 0.00  0.00 - 0.01 K/uL Final    NEUTROPHILS 08/06/2021 70  32 - 75 % Final    LYMPHOCYTES 08/06/2021 19  12 - 49 % Final    MONOCYTES 08/06/2021 9  5 - 13 % Final    EOSINOPHILS 08/06/2021 1  0 - 7 % Final    BASOPHILS 08/06/2021 0  0 - 1 % Final    IMMATURE GRANULOCYTES 08/06/2021 1* 0.0 - 0.5 % Final    ABS. NEUTROPHILS 08/06/2021 8.7* 1.8 - 8.0 K/UL Final    ABS. LYMPHOCYTES 08/06/2021 2.3  0.8 - 3.5 K/UL Final    ABS. MONOCYTES 08/06/2021 1.1* 0.0 - 1.0 K/UL Final    ABS. EOSINOPHILS 08/06/2021 0.1  0.0 - 0.4 K/UL Final    ABS. BASOPHILS 08/06/2021 0.1  0.0 - 0.1 K/UL Final    ABS. IMM. GRANS. 08/06/2021 0.1* 0.00 - 0.04 K/UL Final    DF 08/06/2021 AUTOMATED    Final    Ventricular Rate 08/06/2021 118  BPM Final    Atrial Rate 08/06/2021 118  BPM Final    P-R Interval 08/06/2021 126  ms Final    QRS Duration 08/06/2021 78  ms Final    Q-T Interval 08/06/2021 284  ms Final    QTC Calculation (Bezet) 08/06/2021 398  ms Final    Calculated P Axis 08/06/2021 45  degrees Final    Calculated R Axis 08/06/2021 51  degrees Final    Calculated T Axis 08/06/2021 36  degrees Final    Diagnosis 08/06/2021    Final                    Value:Sinus tachycardia  Septal infarct , age undetermined    No previous ECGs available  Confirmed by Jayy Villarreal M.D., [place]as [place] ([id number]) on 8/7/2021 5:19:29 AM      Salicylate level 00/86/9296 3.8  2.8 - 20.0 MG/DL Final    Comment: Analgesic: 2-10 mg/dL  Anti-inflammatory: 10-30 mg/dl  Toxic: >30 mg/dl      Lipase 08/06/2021 133  73 - 393 U/L Final         Radiographic Studies:  XR Results (most recent):  Results from Hospital Encounter encounter on 03/10/22    XR THUMB RT MIN 2 V    Narrative  EXAM: XR THUMB RT MIN 2 V    INDICATION: Trauma. COMPARISON: None. FINDINGS: Three views of the right thumb demonstrate no fracture or other acute  osseous or articular abnormality. There is soft tissue swelling in the region of  the distal phalanx. . There is no radiopaque foreign body or gas in the soft  tissues. Impression  Soft tissue swelling. No bony abnormalities. No radiopaque foreign  body. [facility] Results (most recent):  No results found for this or any previous visit.      CT Results (most recent):  Results from Hospital Encounter encounter on 08/06/21    CT ABD PELV W WO CONT    Narrative  EXAM: CT ABD PELV W WO CONT    INDICATION: abdominal pain, hematemesis, hematochezia    COMPARISON: CT 6/2/2019. CONTRAST: 100 mL of Isovue-370. TECHNIQUE:  Multislice helical CT was performed from the diaphragm to the iliac crest prior  to intravenous contrast administration and from the diaphragm to the symphysis  pubis during uneventful rapid bolus intravenous contrast administration. Unenhanced, arterial phase, and 90 sec imaging obtained. Oral contrast was not  administered. Contiguous 5 mm axial images were reconstructed and lung and soft  tissue windows were generated. Coronal and sagittal reformations were generated. CT dose reduction was achieved through use of a standardized protocol tailored  for this examination and automatic exposure control for dose modulation. FINDINGS:  LOWER THORAX: No significant abnormality in the incidentally imaged lower chest.  LIVER: No mass. BILIARY TREE: Gallbladder is within normal limits. CBD is not dilated. SPLEEN: No markable. PANCREAS: No mass or ductal dilatation. ADRENALS: Unremarkable. KIDNEYS: No mass, calculus, or hydronephrosis. STOMACH: Unremarkable. No identified active extravasation. SMALL BOWEL: No dilatation or wall thickening. No identified active  extravasation. COLON: No dilatation or wall thickening. No identified active extravasation. APPENDIX: Surgically absent. PERITONEUM: No ascites or pneumoperitoneum. RETROPERITONEUM: No lymphadenopathy or aortic aneurysm. REPRODUCTIVE ORGANS: Prostate and seminal vesicles appear unremarkable. URINARY BLADDER: No mass or calculus. BONES: Mild degenerative spine change. No acute fracture or aggressive lesion. ABDOMINAL WALL: No mass or hernia. ADDITIONAL COMMENTS: N/A    Impression  No identified active gastrointestinal hemorrhage or underlying  etiology for hemorrhage. DEXA Results (most recent):  No results found for this or any previous visit. MRI Results (most recent):  No results found for this or any previous visit. Assessment/Plan:       ICD-10-CM ICD-9-CM    1. Routine adult health maintenance  Z00.00 V70.0 HEPATITIS C AB      ADMIN PNEUMOCOCCAL VACCINE      LIPID PANEL      HEMOGLOBIN A1C WITH EAG      HIV 1/2 AG/AB, 4TH GENERATION,W RFLX CONFIRM      CANCELED: TETANUS, DIPHTHERIA TOXOIDS AND ACELLULAR PERTUSSIS VACCINE (TDAP), IN INDIVIDS. >=7, IM      CANCELED: PNEUMOCOCCAL POLYSACCHARIDE VACCINE, 23-VALENT, ADULT OR IMMUNOSUPPRESSED PT DOSE,   2. Screening for viral disease  Z11.59 V73.99    3. Hypertension, unspecified type  I10 401.9    4. Current smoker  F17.200 305.1    5. Need for hepatitis C screening test  Z11.59 V73.89 HEPATITIS C AB   6. Encounter for immunization  Z23 V03.89 ADMIN PNEUMOCOCCAL VACCINE      CANCELED: TETANUS, DIPHTHERIA TOXOIDS AND ACELLULAR PERTUSSIS VACCINE (TDAP), IN INDIVIDS. >=7, IM      CANCELED: PNEUMOCOCCAL POLYSACCHARIDE VACCINE, 23-VALENT, ADULT OR IMMUNOSUPPRESSED PT DOSE,   7. Infectious folliculitis  R05.2 133.3 mupirocin (BACTROBAN) 2 % ointment    B99.9            This pleasant, obese Hebrew speaking male patient with history of smoking presents for follow-up after recent treatment for felon of his right thumb, and with an acute complaint of pustules in his hairline consistent with folliculitis. He appears to be recovering well there is incision and drainage of the felon, and he is advised to complete the course of antibiotic therapy, monitor for systemic symptoms, and follow-up as previously scheduled with orthopedic surgery. His nodules in the hairline are consistent with likely infectious folliculitis. This is mild to moderate in overall severity clinically, and doxycycline may be required. A trial of topical mupirocin is provided to the patient as noted.   He is advised to call in should his symptoms fail to resolve with this medication, at which point a trial of the doxycycline will be provided. He has pending follow-up with dermatology in several months. Overall, the clinical picture is not consistent with acne vulgaris, papulopustular rosacea, or pseudofolliculitis barbae. He is counseled to quit smoking, although he is contemplative regarding this. His blood pressure is well controlled, without medications. He should attempt therapeutic lifestyle changes including dietary modifications, information on which is provided to the patient in the native language. Routine screening laboratory studies ordered as noted above. Follow-up with me in 3 months. Follow-up and Dispositions    · Return in about 3 months (around 6/15/2022), or if symptoms worsen or fail to improve, for Routine Followup. Follow-up and Disposition History         Britt Lafleur MD    Please note that this dictation was completed with [employer], the computer voice recognition software. Quite often unanticipated grammatical, syntax, homophones, and other interpretive errors are inadvertently transcribed by the computer software. Please disregard these errors. Please excuse any errors that have escaped final proofreading.

## 2022-03-15 NOTE — PROGRESS NOTES
Chief Complaint   Patient presents with    Establish Care     Visit Vitals  /79 (BP 1 Location: Left upper arm, BP Patient Position: Sitting, BP Cuff Size: Adult)   Pulse 84   Temp 98.4 °F (36.9 °C) (Oral)   Resp 16   Ht 5' 7\" (1.702 m)   Wt 200 lb 6.4 oz (90.9 kg)   SpO2 98%   BMI 31.39 kg/m²         1. Have you been to the ER, urgent care clinic since your last visit? Hospitalized since your last visit? No    2. Have you seen or consulted any other health care providers outside of the [address] [place] [address] since your last visit? Include any pap smears or colon screening.  No

## 2022-03-16 ENCOUNTER — APPOINTMENT (OUTPATIENT)
Dept: INTERNAL MEDICINE CLINIC | Age: 42
End: 2022-03-16

## 2022-03-16 DIAGNOSIS — Z00.00 ROUTINE ADULT HEALTH MAINTENANCE: ICD-10-CM

## 2022-03-16 DIAGNOSIS — Z11.59 NEED FOR HEPATITIS C SCREENING TEST: ICD-10-CM

## 2022-03-17 LAB
CHOLEST SERPL-MCNC: 209 MG/DL (ref 100–199)
EST. AVERAGE GLUCOSE BLD GHB EST-MCNC: 114 MG/DL
HBA1C MFR BLD: 5.6 % (ref 4.8–5.6)
HCV AB S/CO SERPL IA: <0.1 S/CO RATIO (ref 0–0.9)
HDLC SERPL-MCNC: 35 MG/DL
HIV 1+2 AB+HIV1 P24 AG SERPL QL IA: NON REACTIVE
LDLC SERPL CALC-MCNC: 149 MG/DL (ref 0–99)
TRIGL SERPL-MCNC: 136 MG/DL (ref 0–149)
VLDLC SERPL CALC-MCNC: 25 MG/DL (ref 5–40)

## 2022-03-17 NOTE — PROGRESS NOTES
Please let the patient know that his labs were generally normal. His cholesterol was elevated but not to the point that requires medication at this time. He should quit smoking and adhere to a healthy diet to avoid adverse outcomes like a heart attack or a stroke.

## 2022-03-24 PROBLEM — L03.011 FELON OF FINGER OF RIGHT HAND: Status: ACTIVE | Noted: 2022-03-01

## 2022-05-17 ENCOUNTER — APPOINTMENT (OUTPATIENT)
Dept: VASCULAR SURGERY | Age: 42
End: 2022-05-17
Attending: EMERGENCY MEDICINE
Payer: COMMERCIAL

## 2022-05-17 ENCOUNTER — HOSPITAL ENCOUNTER (EMERGENCY)
Age: 42
Discharge: HOME | End: 2022-05-17
Attending: STUDENT IN AN ORGANIZED HEALTH CARE EDUCATION/TRAINING PROGRAM
Payer: COMMERCIAL

## 2022-05-17 ENCOUNTER — APPOINTMENT (OUTPATIENT)
Dept: GENERAL RADIOLOGY | Age: 42
End: 2022-05-17
Attending: EMERGENCY MEDICINE
Payer: COMMERCIAL

## 2022-05-17 VITALS
OXYGEN SATURATION: 99 % | HEIGHT: 69 IN | WEIGHT: 175 LBS | DIASTOLIC BLOOD PRESSURE: 78 MMHG | SYSTOLIC BLOOD PRESSURE: 119 MMHG | BODY MASS INDEX: 25.92 KG/M2 | HEART RATE: 89 BPM | RESPIRATION RATE: 18 BRPM | TEMPERATURE: 98.5 F

## 2022-05-17 DIAGNOSIS — M79.662 PAIN AND SWELLING OF LEFT LOWER LEG: Primary | ICD-10-CM

## 2022-05-17 DIAGNOSIS — M79.89 PAIN AND SWELLING OF LEFT LOWER LEG: Primary | ICD-10-CM

## 2022-05-17 DIAGNOSIS — M25.552 LEFT HIP PAIN: ICD-10-CM

## 2022-05-17 PROCEDURE — 93971 EXTREMITY STUDY: CPT

## 2022-05-17 PROCEDURE — 73502 X-RAY EXAM HIP UNI 2-3 VIEWS: CPT

## 2022-05-17 PROCEDURE — 99284 EMERGENCY DEPT VISIT MOD MDM: CPT

## 2022-05-17 RX ORDER — KETOROLAC TROMETHAMINE 10 MG/1
10 TABLET, FILM COATED ORAL
Qty: 15 TABLET | Refills: 0 | Status: SHIPPED | OUTPATIENT
Start: 2022-05-17 | End: 2022-05-23

## 2022-05-17 NOTE — Clinical Note
[address]  [address] [facility] [address]  [phone]    Work/School Note    Date: 5/17/2022    To Whom It May concern:    Chhaya Martino was seen and treated today in the emergency room by the following provider(s):  Attending Provider: Karen Rangel MD  Nurse Practitioner: Valencia Granados NP. Chhaya Martino is excused from work/school on 5/17/2022 through 5/19/2022. He is medically clear to return to work/school on 5/20/2022.          Sincerely,          Sridhar Mack NP

## 2022-05-17 NOTE — Clinical Note
[address]  [address] [place] [address]  [phone]    Work/School Note    Date: 5/17/2022    To Whom It May concern:    Ines Rutherford was seen and treated today in the emergency room by the following provider(s):  Attending Provider: Nancyann Apley, MD  Nurse Practitioner: Antonio Jack NP. Ines Rutherford is excused from work/school on 5/17/2022 through 5/19/2022. He is medically clear to return to work/school on 5/20/2022.          Sincerely,          Tawanna Sandhoff, NP

## 2022-05-17 NOTE — ED PROVIDER NOTES
HPI patient is a 79-year-old male with past medical history significant for nicotine addiction, HTN and folliculitis who presentsto the ED with 10 days of left leg swelling and pain. He denies any falls, direct injury or blunt trauma. Denies any prior history of blood clots. Denies any fever, difficulty breathing, difficulty swallowing, SOB or chest pain. Denies any nausea, vomiting or diarrhea. Pain increases with weightbearing. Patient states that he took [facility] powder and Tylenol without relief. Old chart reviewed. Past Medical History:   Diagnosis Date    Current smoker     Felon of finger of right hand 58/3204    Folliculitis     Khan, scalp, groin - presumptive infectious - RX = Mupirocin    Hypertension     Morbid obesity ([facility] [address]     BMI = 31.4 (3/22)       Past Surgical History:   Procedure Laterality Date    HX APPENDECTOMY           No family history on file. Social History     Socioeconomic History    Marital status:      Spouse name: Not on file    Number of children: Not on file    Years of education: Not on file    Highest education level: Not on file   Occupational History    Not on file   Tobacco Use    Smoking status: Current Every Day Smoker     Packs/day: 0.75    Smokeless tobacco: Never Used   Substance and Sexual Activity    Alcohol use: Yes    Drug use: Never    Sexual activity: Not on file   Other Topics Concern    Not on file   Social History Narrative    Not on file     Social Determinants of Health     Financial Resource Strain:     Difficulty of Paying Living Expenses: Not on file   Food Insecurity:     Worried About Running Out of Food in the Last Year: Not on file    Ashish of Food in the Last Year: Not on file   Transportation Needs:     Lack of Transportation (Medical): Not on file    Lack of Transportation (Non-Medical):  Not on file   Physical Activity:     Days of Exercise per Week: Not on file    Minutes of Exercise per Session: Not on file Stress:     Feeling of Stress : Not on file   Social Connections:     Frequency of Communication with Friends and Family: Not on file    Frequency of Social Gatherings with Friends and Family: Not on file    Attends Hinduism Services: Not on file    Active Member of Clubs or Organizations: Not on file    Attends Club or Organization Meetings: Not on file    Marital Status: Not on file   Intimate Partner Violence:     Fear of Current or Ex-Partner: Not on file    Emotionally Abused: Not on file    Physically Abused: Not on file    Sexually Abused: Not on file   Housing Stability:     Unable to Pay for Housing in the Last Year: Not on file    Number of [place] in the Last Year: Not on file    Unstable Housing in the Last Year: Not on file         ALLERGIES: Patient has no known allergies. Review of Systems   Constitutional: Negative for activity change, appetite change, fever and unexpected weight change. HENT: Negative for congestion, sore throat and trouble swallowing. Eyes: Negative for visual disturbance. Respiratory: Negative for cough. Cardiovascular: Positive for leg swelling. Negative for chest pain and palpitations. Gastrointestinal: Negative for abdominal pain, diarrhea, nausea and vomiting. Genitourinary: Negative for dysuria and flank pain. Musculoskeletal: Negative for arthralgias and joint swelling. Skin: Negative for rash. Neurological: Negative for dizziness, light-headedness and headaches. All other systems reviewed and are negative. Vitals:    05/17/22 0703   BP: 119/78   Pulse: 89   Resp: 18   Temp: 98.5 °F (36.9 °C)   SpO2: 99%   Weight: 79.4 kg (175 lb)   Height: 5' 8.9\" (1.75 m)            Physical Exam  Vitals and nursing note reviewed. Constitutional:       General: He is not in acute distress. Appearance: Normal appearance. He is normal weight. He is not ill-appearing, toxic-appearing or diaphoretic.       Comments: [place] male, smoker, works at a computer/IT   HENT:      Head: Normocephalic. Cardiovascular:      Rate and Rhythm: Normal rate and regular rhythm. Pulmonary:      Effort: Pulmonary effort is normal.      Breath sounds: Normal breath sounds. Musculoskeletal:         General: Swelling and tenderness present. No deformity or signs of injury. Normal range of motion. Cervical back: Normal range of motion and neck supple. Left lower leg: Edema present. Comments: Reports left lower leg pain and swelling for over 10 days; Skin integrity is intact. There is no obvious bony or soft tissue deformity; no obvious rash, erythema, petechiae or bruising. Good neurovascular sensation. No apparent tendon or nerve injury. Neurological:      Mental Status: He is alert and oriented to person, place, and time. MDM       Procedures    XR HIP LT W OR WO PELV 2-3 VWS    Result Date: 5/17/2022  No acute abnormality. Duplex venous study of the left leg is negative for DVT. Recommend close follow-up with PCP and orthopedic specialist for further evaluation and treatment. Patient's results and plan of care have been reviewed with him. Patient has verbally conveyed his understanding and agreement of his signs, symptoms, diagnosis, treatment and prognosis and additionally agrees to follow up as recommended or return to the Emergency Room should his condition change prior to follow-up. Discharge instructions have also been provided to the patient with some educational information regarding his diagnosis as well a list of reasons why he would want to return to the ER prior to his follow-up appointment should his condition change. Italo Rivas NP

## 2022-05-17 NOTE — ED TRIAGE NOTES
Patient reports intermittent swelling in the left leg for the last 10 days. Patient also states he was seen for an issue regarding his thumb about a month ago and states he now has a \"2nd nail growing underneath\". Patient was also seen for red bumps all over his head and chin but was unable to get a doctors appointment until august.     Patient also reports a tightness in his left leg with difficulty bending and moving which is causing him to limp. Patient reports numbness from this middle of his palm to the tips of his right hand. Patient took tyelnol with no relief. Patient reports that when his left leg swells -- he has to rest in bed for a couple hours and the swelling will go down.

## 2022-05-23 ENCOUNTER — OFFICE VISIT (OUTPATIENT)
Dept: ORTHOPEDIC SURGERY | Age: 42
End: 2022-05-23
Payer: COMMERCIAL

## 2022-05-23 VITALS — HEIGHT: 67 IN | BODY MASS INDEX: 26.68 KG/M2 | WEIGHT: 170 LBS

## 2022-05-23 DIAGNOSIS — M25.552 LEFT HIP PAIN: ICD-10-CM

## 2022-05-23 DIAGNOSIS — M25.551 RIGHT HIP PAIN: Primary | ICD-10-CM

## 2022-05-23 PROCEDURE — 99204 OFFICE O/P NEW MOD 45 MIN: CPT | Performed by: ORTHOPAEDIC SURGERY

## 2022-05-23 RX ORDER — DICLOFENAC SODIUM 75 MG/1
75 TABLET, DELAYED RELEASE ORAL 2 TIMES DAILY WITH MEALS
Qty: 60 TABLET | Refills: 0 | Status: SHIPPED | OUTPATIENT
Start: 2022-05-23

## 2022-05-23 NOTE — PROGRESS NOTES
Kimberly Lugo (: 1980) is a 42 y.o. male patient, here for evaluation of the following chief complaint(s):  Leg Pain (left leg pain)       ASSESSMENT/PLAN:  Below is the assessment and plan developed based on review of pertinent history, physical exam, labs, studies, and medications. 42-year-old male comes in today for 2 issues. 12 days ago he noted some swelling of his left lower extremity. He was seen in the ER on 2022 and was negative for a DVT. States the swelling has improved but is still present. Describes it to be generalized tightness. No sharp shooting numbness tingling. Referral given to follow-up with primary care for further evaluation as needed. He also complains of issues with the right leg. Limited hip flexion. Has to lift his leg to get in and out of the car. Unable to put on his shoes. Denies any sharp shooting numbness tingling pain down his leg. Feels like when he walks he has to drag his leg secondary to lack of hip flexor motion. X-rays reveal cam formation, concern for impingement. We will plan to obtain an MRI for further evaluation. In the meantime patient started on diclofenac 75 mg twice daily as needed for discomfort. Follow-up once MRI completed or sooner as needed. Instructed to follow-up in the ER if any acute changes occur. Patient verbalizes understanding. 1. Right hip pain  -     MRI HIP  RT  WO CONT; Future  2. Left hip pain  -     XR PELV 1 OR 2 V; Future  -     XR SPINE LUMB 2 OR 3 V; Future  -     REFERRAL TO PRIMARY CARE      Encounter Diagnoses   Name Primary?  Left hip pain     Right hip pain Yes        No follow-ups on file. SUBJECTIVE/OBJECTIVE:  Kimberly Lugo (: 1980) is a 42 y.o. male who presents today for the following:  Chief Complaint   Patient presents with    Leg Pain     left leg pain       42-year-old male comes in today for 2 issues. 12 days ago he noted some swelling of his left lower extremity. He was seen in the ER on 5/17/2022 and was negative for a DVT. States the swelling has improved but is still present. Describes it to be generalized tightness. No sharp shooting numbness tingling. No difficulty breathing. No shortness of breath. He also complains of issues with the right leg. Limited hip flexion. Has to lift his leg to get in and out of the car. Unable to put on his shoes. Denies any sharp shooting numbness tingling pain down his leg. Feels like when he walks he has to drag his leg secondary to lack of hip flexor motion. No issues with bowel or bladder. IMAGING:  XR Results (most recent):  Results from Appointment encounter on 05/23/22    XR SPINE LUMB 2 OR 3 V    Narrative  Lateral lumbar spine x-rays ordered and personally reviewed. Joint space well-preserved. No overt osteoarthritic changes noted. No Known Allergies    Current Outpatient Medications   Medication Sig    ketorolac (TORADOL) 10 mg tablet Take 1 Tablet by mouth three (3) times daily as needed for Pain. (Patient not taking: Reported on 5/23/2022)    ketoconazole (NIZORAL) 2 % shampoo Apply 5 mL to affected area every other day. (Patient not taking: Reported on 5/23/2022)    ibuprofen (MOTRIN) 600 mg tablet Take 1 Tablet by mouth every six (6) hours as needed for Pain. (Patient not taking: Reported on 5/23/2022)    ketoconazole (NIZORAL) 2 % topical cream Apply  to affected area daily. (Patient not taking: Reported on 5/23/2022)    acetaminophen (TYLENOL) 500 mg tablet Take 2 Tablets by mouth every six (6) hours as needed for Pain or Fever. (Patient not taking: Reported on 5/23/2022)     No current facility-administered medications for this visit.        Past Medical History:   Diagnosis Date    Current smoker     Felon of finger of right hand 81/8861    Folliculitis     Khan, scalp, groin - presumptive infectious - RX = Mupirocin    Hypertension     Morbid obesity ([facility] [address]     BMI = 31.4 (3/22) Past Surgical History:   Procedure Laterality Date    HX APPENDECTOMY         No family history on file. Social History     Tobacco Use    Smoking status: Current Every Day Smoker     Packs/day: 0.75    Smokeless tobacco: Never Used   Substance Use Topics    Alcohol use: Yes        All systems reviewed x 12 and were negative with the exception of None      No flowsheet data found. Vitals:  Ht 5' 7\" (1.702 m)   Wt 170 lb (77.1 kg)   BMI 26.63 kg/m²    Body mass index is 26.63 kg/m². Physical Exam    General: NAD, well developed, well nourished. Cardiac: Extremities well perfused. Respiratory: Nonlabored breathing. RLE: Mild to moderate antalgic gait. Significant stinchfield. 0-100 degrees of flexion. >20 degrees internal rotation, >30 degrees external rotation. Negative KEE. Significant pain with flexion adduction and internal rotation. .  Motor strength grossly intact. LLE: Negative stinchfield. 0-100 degrees of flexion. >20 degrees internal rotation, >30 degrees external rotation. Negative KEE. No pain with flexion adduction and internal rotation. Mild edema to lower extremity. Motor strength grossly intact. Skin: Warm well perfused. Vascular: Palpable pedal pulses bilaterally. Equal. Capillary refill less than 2 seconds. Say Ayala M.D. was available for immediate consultation as the supervising physician. An electronic signature was used to authenticate this note.   -- Radha Marti PA-C

## 2022-05-26 ENCOUNTER — HOSPITAL ENCOUNTER (OUTPATIENT)
Dept: MRI IMAGING | Age: 42
Discharge: HOME | End: 2022-05-26
Attending: ORTHOPAEDIC SURGERY
Payer: COMMERCIAL

## 2022-05-26 DIAGNOSIS — M25.551 RIGHT HIP PAIN: ICD-10-CM

## 2022-05-26 PROCEDURE — 73721 MRI JNT OF LWR EXTRE W/O DYE: CPT

## 2022-06-13 PROBLEM — M25.859 IMPINGEMENT SYNDROME, HIP: Status: ACTIVE | Noted: 2022-05-01

## 2024-02-25 ENCOUNTER — APPOINTMENT (OUTPATIENT)
Facility: HOSPITAL | Age: 44
End: 2024-02-25

## 2024-02-25 ENCOUNTER — HOSPITAL ENCOUNTER (EMERGENCY)
Facility: HOSPITAL | Age: 44
Discharge: HOME | End: 2024-02-25
Attending: EMERGENCY MEDICINE

## 2024-02-25 VITALS
SYSTOLIC BLOOD PRESSURE: 126 MMHG | BODY MASS INDEX: 26.03 KG/M2 | RESPIRATION RATE: 16 BRPM | DIASTOLIC BLOOD PRESSURE: 86 MMHG | WEIGHT: 166.23 LBS | TEMPERATURE: 98.3 F | OXYGEN SATURATION: 99 % | HEART RATE: 91 BPM

## 2024-02-25 DIAGNOSIS — L03.90 CELLULITIS, UNSPECIFIED CELLULITIS SITE: ICD-10-CM

## 2024-02-25 DIAGNOSIS — R10.84 GENERALIZED ABDOMINAL PAIN: ICD-10-CM

## 2024-02-25 DIAGNOSIS — M54.50 ACUTE RIGHT-SIDED LOW BACK PAIN WITHOUT SCIATICA: Primary | ICD-10-CM

## 2024-02-25 LAB
ALBUMIN SERPL-MCNC: 3.7 G/DL (ref 3.5–5)
ALBUMIN/GLOB SERPL: 0.8 (ref 1.1–2.2)
ALP SERPL-CCNC: 97 U/L (ref 45–117)
ALT SERPL-CCNC: 14 U/L (ref 12–78)
ANION GAP SERPL CALC-SCNC: 1 MMOL/L (ref 5–15)
APPEARANCE UR: CLEAR
AST SERPL-CCNC: 17 U/L (ref 15–37)
BACTERIA URNS QL MICRO: NEGATIVE /HPF
BASOPHILS # BLD: 0.1 K/UL (ref 0–0.1)
BASOPHILS NFR BLD: 0 % (ref 0–1)
BILIRUB SERPL-MCNC: 1.4 MG/DL (ref 0.2–1)
BILIRUB UR QL CFM: NEGATIVE
BUN SERPL-MCNC: 16 MG/DL (ref 6–20)
BUN/CREAT SERPL: 17 (ref 12–20)
CALCIUM SERPL-MCNC: 9.1 MG/DL (ref 8.5–10.1)
CHLORIDE SERPL-SCNC: 106 MMOL/L (ref 97–108)
CO2 SERPL-SCNC: 30 MMOL/L (ref 21–32)
COLOR UR: ABNORMAL
COMMENT:: NORMAL
CREAT SERPL-MCNC: 0.92 MG/DL (ref 0.7–1.3)
DIFFERENTIAL METHOD BLD: ABNORMAL
EOSINOPHIL # BLD: 0 K/UL (ref 0–0.4)
EOSINOPHIL NFR BLD: 0 % (ref 0–7)
EPITH CASTS URNS QL MICRO: ABNORMAL /LPF
ERYTHROCYTE [DISTWIDTH] IN BLOOD BY AUTOMATED COUNT: 12.5 % (ref 11.5–14.5)
GLOBULIN SER CALC-MCNC: 4.4 G/DL (ref 2–4)
GLUCOSE SERPL-MCNC: 131 MG/DL (ref 65–100)
GLUCOSE UR STRIP.AUTO-MCNC: NEGATIVE MG/DL
HCT VFR BLD AUTO: 44.1 % (ref 36.6–50.3)
HGB BLD-MCNC: 14.6 G/DL (ref 12.1–17)
HGB UR QL STRIP: ABNORMAL
IMM GRANULOCYTES # BLD AUTO: 0.1 K/UL (ref 0–0.04)
IMM GRANULOCYTES NFR BLD AUTO: 0 % (ref 0–0.5)
KETONES UR QL STRIP.AUTO: ABNORMAL MG/DL
LEUKOCYTE ESTERASE UR QL STRIP.AUTO: NEGATIVE
LIPASE SERPL-CCNC: 19 U/L (ref 13–75)
LYMPHOCYTES # BLD: 2.2 K/UL (ref 0.8–3.5)
LYMPHOCYTES NFR BLD: 16 % (ref 12–49)
MCH RBC QN AUTO: 28.5 PG (ref 26–34)
MCHC RBC AUTO-ENTMCNC: 33.1 G/DL (ref 30–36.5)
MCV RBC AUTO: 86.1 FL (ref 80–99)
MONOCYTES # BLD: 1.4 K/UL (ref 0–1)
MONOCYTES NFR BLD: 10 % (ref 5–13)
MUCOUS THREADS URNS QL MICRO: ABNORMAL /LPF
NEUTS SEG # BLD: 9.9 K/UL (ref 1.8–8)
NEUTS SEG NFR BLD: 74 % (ref 32–75)
NITRITE UR QL STRIP.AUTO: NEGATIVE
NRBC # BLD: 0 K/UL (ref 0–0.01)
NRBC BLD-RTO: 0 PER 100 WBC
PH UR STRIP: 5 (ref 5–8)
PLATELET # BLD AUTO: 220 K/UL (ref 150–400)
PMV BLD AUTO: 9.6 FL (ref 8.9–12.9)
POTASSIUM SERPL-SCNC: 3.9 MMOL/L (ref 3.5–5.1)
PROT SERPL-MCNC: 8.1 G/DL (ref 6.4–8.2)
PROT UR STRIP-MCNC: 300 MG/DL
RBC # BLD AUTO: 5.12 M/UL (ref 4.1–5.7)
RBC #/AREA URNS HPF: ABNORMAL /HPF (ref 0–5)
SODIUM SERPL-SCNC: 137 MMOL/L (ref 136–145)
SP GR UR REFRACTOMETRY: >1.03
SPECIMEN HOLD: NORMAL
SPECIMEN HOLD: NORMAL
UROBILINOGEN UR QL STRIP.AUTO: 0.2 EU/DL (ref 0.2–1)
WBC # BLD AUTO: 13.5 K/UL (ref 4.1–11.1)
WBC URNS QL MICRO: ABNORMAL /HPF (ref 0–4)
YEAST BUDDING URNS QL: PRESENT

## 2024-02-25 PROCEDURE — 36415 COLL VENOUS BLD VENIPUNCTURE: CPT

## 2024-02-25 PROCEDURE — 96374 THER/PROPH/DIAG INJ IV PUSH: CPT

## 2024-02-25 PROCEDURE — 6360000002 HC RX W HCPCS: Performed by: FAMILY MEDICINE

## 2024-02-25 PROCEDURE — 80053 COMPREHEN METABOLIC PANEL: CPT

## 2024-02-25 PROCEDURE — 85025 COMPLETE CBC W/AUTO DIFF WBC: CPT

## 2024-02-25 PROCEDURE — 83690 ASSAY OF LIPASE: CPT

## 2024-02-25 PROCEDURE — 74177 CT ABD & PELVIS W/CONTRAST: CPT

## 2024-02-25 PROCEDURE — 6360000004 HC RX CONTRAST MEDICATION: Performed by: STUDENT IN AN ORGANIZED HEALTH CARE EDUCATION/TRAINING PROGRAM

## 2024-02-25 PROCEDURE — 99285 EMERGENCY DEPT VISIT HI MDM: CPT

## 2024-02-25 PROCEDURE — 81001 URINALYSIS AUTO W/SCOPE: CPT

## 2024-02-25 PROCEDURE — 6370000000 HC RX 637 (ALT 250 FOR IP): Performed by: FAMILY MEDICINE

## 2024-02-25 RX ORDER — CYCLOBENZAPRINE HCL 10 MG
10 TABLET ORAL
Status: COMPLETED | OUTPATIENT
Start: 2024-02-25 | End: 2024-02-25

## 2024-02-25 RX ORDER — CYCLOBENZAPRINE HCL 10 MG
10 TABLET ORAL 3 TIMES DAILY PRN
Qty: 21 TABLET | Refills: 0 | Status: SHIPPED | OUTPATIENT
Start: 2024-02-25 | End: 2024-03-06

## 2024-02-25 RX ORDER — DOXYCYCLINE HYCLATE 100 MG
100 TABLET ORAL 2 TIMES DAILY
Qty: 14 TABLET | Refills: 0 | Status: SHIPPED | OUTPATIENT
Start: 2024-02-25 | End: 2024-03-03

## 2024-02-25 RX ORDER — DICLOFENAC SODIUM 75 MG/1
75 TABLET, DELAYED RELEASE ORAL 2 TIMES DAILY
Qty: 28 TABLET | Refills: 0 | Status: SHIPPED | OUTPATIENT
Start: 2024-02-25 | End: 2024-03-10

## 2024-02-25 RX ORDER — KETOROLAC TROMETHAMINE 30 MG/ML
30 INJECTION, SOLUTION INTRAMUSCULAR; INTRAVENOUS
Status: COMPLETED | OUTPATIENT
Start: 2024-02-25 | End: 2024-02-25

## 2024-02-25 RX ADMIN — CYCLOBENZAPRINE 10 MG: 10 TABLET, FILM COATED ORAL at 11:19

## 2024-02-25 RX ADMIN — KETOROLAC TROMETHAMINE 30 MG: 30 INJECTION, SOLUTION INTRAMUSCULAR; INTRAVENOUS at 11:20

## 2024-02-25 RX ADMIN — IOPAMIDOL 100 ML: 755 INJECTION, SOLUTION INTRAVENOUS at 12:13

## 2024-02-25 ASSESSMENT — PAIN SCALES - GENERAL
PAINLEVEL_OUTOF10: 7
PAINLEVEL_OUTOF10: 10

## 2024-02-25 ASSESSMENT — PAIN DESCRIPTION - FREQUENCY: FREQUENCY: CONTINUOUS

## 2024-02-25 ASSESSMENT — PAIN DESCRIPTION - LOCATION
LOCATION: BACK
LOCATION: ABDOMEN;BACK

## 2024-02-25 ASSESSMENT — PAIN DESCRIPTION - PAIN TYPE: TYPE: ACUTE PAIN

## 2024-02-25 ASSESSMENT — PAIN DESCRIPTION - ORIENTATION: ORIENTATION: RIGHT;LOWER

## 2024-02-25 ASSESSMENT — PAIN - FUNCTIONAL ASSESSMENT: PAIN_FUNCTIONAL_ASSESSMENT: ACTIVITIES ARE NOT PREVENTED

## 2024-02-25 ASSESSMENT — PAIN DESCRIPTION - DESCRIPTORS
DESCRIPTORS: ACHING
DESCRIPTORS: SHARP

## 2024-02-25 NOTE — ED NOTES
Patient discharged by provider. Discharge paperwork reviewed with patient, patient verbalized understanding. Pt ambulatory with steady gait out of ED.

## 2024-02-25 NOTE — ED TRIAGE NOTES
Patient arrives ambulatory to ED with complaints of back pain, abdominal pain, and \"bumps\" on skin. States abdominal pain is chronic and back pain has been present for one month. Denies N/V/D.

## 2024-02-25 NOTE — ED PROVIDER NOTES
[facility] EMERGENCY DEP  EMERGENCY DEPARTMENT ENCOUNTER      Pt Name: Kanu Garcia  MRN: [id number]  Birthdate 1980  Date of evaluation: 2/25/2024  Provider: KEIRA Mcpherson NP    CHIEF COMPLAINT     Back Pain      HISTORY OF PRESENT ILLNESS   (Location/Symptom, Timing/Onset, Context/Setting, Quality, Duration, Modifying Factors, Severity)  Note limiting factors.   Patient is a 43-year-old male with history of folliculitis, hyperlipidemia, hypertension, osteoarthritis presenting to the emergency permit for evaluation of back pain and abdominal pain.  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  Pain has been persistent and has not worked out.  Not taking any specific medication for symptoms.  No fevers, saddle seizure, bowel or bladder incontinence.  Additionally notes that he has had chronic abdominal pain for multiple years and is seen multiple doctors including the gastroenterologist without answers.  Denies nausea, vomiting, diarrhea, or constipation.  Reports history of folliculitis as well and feels it is flared up.        The history is provided by the patient.         Review of External Medical Records:     Nursing Notes were reviewed.    REVIEW OF SYSTEMS    (2-9 systems for level 4, 10 or more for level 5)     Review of Systems   Constitutional:  Negative for unexpected weight change.   HENT:  Negative for congestion.    Eyes:  Negative for visual disturbance.   Respiratory:  Negative for cough and shortness of breath.    Cardiovascular:  Negative for chest pain and palpitations.   Gastrointestinal:  Positive for abdominal pain. Negative for nausea and vomiting.   Endocrine: Negative for polyuria.   Genitourinary:  Negative for dysuria and flank pain.   Musculoskeletal:  Positive for back pain.   Skin:  Positive for rash. Negative for pallor.   Allergic/Immunologic: Negative for immunocompromised